# Patient Record
Sex: FEMALE | Race: WHITE | Employment: UNEMPLOYED | URBAN - METROPOLITAN AREA
[De-identification: names, ages, dates, MRNs, and addresses within clinical notes are randomized per-mention and may not be internally consistent; named-entity substitution may affect disease eponyms.]

---

## 2017-09-25 ENCOUNTER — OFFICE VISIT (OUTPATIENT)
Dept: FAMILY MEDICINE CLINIC | Age: 42
End: 2017-09-25

## 2017-09-25 VITALS
RESPIRATION RATE: 18 BRPM | BODY MASS INDEX: 28.77 KG/M2 | WEIGHT: 201 LBS | HEIGHT: 70 IN | SYSTOLIC BLOOD PRESSURE: 138 MMHG | DIASTOLIC BLOOD PRESSURE: 96 MMHG | OXYGEN SATURATION: 98 % | HEART RATE: 87 BPM | TEMPERATURE: 98.2 F

## 2017-09-25 DIAGNOSIS — N93.9 ABNORMAL VAGINAL BLEEDING: Primary | ICD-10-CM

## 2017-09-25 DIAGNOSIS — F32.A DEPRESSIVE DISORDER: ICD-10-CM

## 2017-09-25 DIAGNOSIS — R53.83 FATIGUE, UNSPECIFIED TYPE: ICD-10-CM

## 2017-09-25 DIAGNOSIS — D25.9 UTERINE LEIOMYOMA, UNSPECIFIED LOCATION: ICD-10-CM

## 2017-09-25 DIAGNOSIS — R03.0 ELEVATED BLOOD PRESSURE READING: ICD-10-CM

## 2017-09-25 DIAGNOSIS — N89.8 VAGINAL DISCHARGE: ICD-10-CM

## 2017-09-25 LAB
BILIRUB UR QL STRIP: NEGATIVE
GLUCOSE UR-MCNC: NEGATIVE MG/DL
HCG URINE, QL. (POC): NEGATIVE
KETONES P FAST UR STRIP-MCNC: NEGATIVE MG/DL
PH UR STRIP: 6 [PH] (ref 4.6–8)
PROT UR QL STRIP: NEGATIVE MG/DL
SP GR UR STRIP: 1.02 (ref 1–1.03)
UA UROBILINOGEN AMB POC: NORMAL (ref 0.2–1)
URINALYSIS CLARITY POC: CLEAR
URINALYSIS COLOR POC: YELLOW
URINE BLOOD POC: NORMAL
URINE LEUKOCYTES POC: NEGATIVE
URINE NITRITES POC: NEGATIVE
VALID INTERNAL CONTROL?: YES

## 2017-09-25 RX ORDER — CLINDAMYCIN HYDROCHLORIDE 300 MG/1
300 CAPSULE ORAL 2 TIMES DAILY
Qty: 14 CAP | Refills: 0 | Status: SHIPPED | OUTPATIENT
Start: 2017-09-25 | End: 2017-10-02

## 2017-09-25 NOTE — MR AVS SNAPSHOT
Visit Information Date & Time Provider Department Dept. Phone Encounter #  
 9/25/2017  3:30 PM Sergio Gonzalez NP [address] [facility] [phone] [id number] Follow-up Instructions Return in about 2 weeks (around 10/9/2017) for recheck BP and check cholesterol fasting. Upcoming Health Maintenance Date Due Pneumococcal 19-64 Medium Risk (1 of 1 - PPSV23) 11/10/1993 DTaP/Tdap/Td series (1 - Tdap) 11/10/1995 PAP AKA CERVICAL CYTOLOGY 11/10/1995 INFLUENZA AGE 9 TO ADULT 8/1/2017 Allergies as of 9/25/2017  Review Complete On: 9/25/2017 By: Sergio Gonzalez NP Severity Noted Reaction Type Reactions Ciprofloxacin High 02/17/2016    Swelling Current Immunizations  Reviewed on 3/8/2016 No immunizations on file. Not reviewed this visit You Were Diagnosed With   
  
 Codes Comments Abnormal vaginal bleeding    -  Primary ICD-10-CM: N93.9 ICD-9-CM: 623.8 Vaginal discharge     ICD-10-CM: N89.8 ICD-9-CM: 623.5 Fatigue, unspecified type     ICD-10-CM: R53.83 ICD-9-CM: 780.79 Uterine leiomyoma, unspecified location     ICD-10-CM: D25.9 ICD-9-CM: 218.9 Elevated blood pressure reading     ICD-10-CM: R03.0 ICD-9-CM: 796.2 Depressive disorder     ICD-10-CM: F32.9 ICD-9-CM: 466 Vitals BP Pulse Temp Resp Height(growth percentile) Weight(growth percentile) (!) 138/96 87 98.2 °F (36.8 °C) (Oral) 18 5' 10\" (1.778 m) 201 lb (91.2 kg) LMP SpO2 BMI OB Status Smoking Status 09/08/2017 98% 28.84 kg/m2 Having regular periods Current Every Day Smoker Vitals History BMI and BSA Data Body Mass Index Body Surface Area  
 28.84 kg/m 2 2.12 m 2 Preferred Pharmacy Pharmacy Name Phone [address] Your Updated Medication List  
  
   
This list is accurate as of: 9/25/17  4:40 PM.  Always use your most recent med list.  
  
  
  
  
 clindamycin 300 mg capsule Commonly known as:  CLEOCIN Take 1 Cap by mouth two (2) times a day for 7 days. Prescriptions Sent to Pharmacy Refills  
 clindamycin (CLEOCIN) 300 mg capsule 0 Sig: Take 1 Cap by mouth two (2) times a day for 7 days. Class: Normal  
 Pharmacy: [facility] [id number] [place], [place] [address]wy, [address] #: [phone] Route: Oral  
  
We Performed the Following AMB POC URINALYSIS DIP STICK AUTO W/O MICRO [68464 CPT(R)] AMB POC URINE PREGNANCY TEST, VISUAL COLOR COMPARISON [49687 CPT(R)] BETA HCG, QT [id number] CPT(R)] CBC WITH AUTOMATED DIFF [74757 CPT(R)] IRON PROFILE [id number] CPT(R)] METABOLIC PANEL, COMPREHENSIVE [51761 CPT(R)] [address] [id number] Custom] REFERRAL TO OBSTETRICS AND GYNECOLOGY [REF51 Custom] TSH 3RD GENERATION [92115 CPT(R)] VITAMIN D, 25 HYDROXY [id number] CPT(R)] Follow-up Instructions Return in about 2 weeks (around 10/9/2017) for recheck BP and check cholesterol fasting. To-Do List   
 09/27/2017 Imaging:  CT ABD PELV W WO CONT Referral Information Referral ID Referred By Referred To  
  
 [id number] Lianna Osborne MD   
   [address] [facility] [address] [place], [address] [place] [address] Pkwy Phone: [phone] Fax: [phone] Visits Status Start Date End Date 1 New Request 9/25/17 9/25/18 If your referral has a status of pending review or denied, additional information will be sent to support the outcome of this decision. Patient Instructions Clindamycin (By mouth) Clindamycin (skuh-qg-GJC-sin) Treats infections. Brand Name(s): Cleocin, Cleocin HCl, Cleocin Pediatric There may be other brand names for this medicine. When This Medicine Should Not Be Used: This medicine is not right for everyone.  Do not use it if you had an allergic reaction to clindamycin or lincomycin. How to Use This Medicine:  
Capsule, Liquid · Your doctor will tell you how much medicine to use. Do not use more than directed. · Capsule: Swallow with a full glass of water. · Oral liquid: Measure the oral liquid medicine with a marked measuring spoon, oral syringe, or medicine cup. · Take all of the medicine in your prescription to clear up your infection, even if you feel better after the first few doses. · Missed dose: Take a dose as soon as you remember. If it is almost time for your next dose, wait until then and take a regular dose. Do not take extra medicine to make up for a missed dose. · Store the medicine in a closed container at room temperature, away from heat, moisture, and direct light. Oral liquid: Do not refrigerate or freeze. Throw away any unused medicine after 14 days. Drugs and Foods to Avoid: Ask your doctor or pharmacist before using any other medicine, including over-the-counter medicines, vitamins, and herbal products. · Some medicines can affect how clindamycin works. Tell your doctor if you are using erythromycin. Warnings While Using This Medicine: · Tell your doctor if you are pregnant or breastfeeding, or if you have kidney disease, liver disease, allergies (including an allergy to aspirin), asthma, or stomach or bowel problems (including colitis). · This medicine may cause severe skin reactions. · This medicine can cause diarrhea. Call your doctor if the diarrhea becomes severe, does not stop, or is bloody. Do not take any medicine to stop diarrhea until you have talked to your doctor. Diarrhea can occur 2 months or more after you stop taking this medicine. · Keep all medicine out of the reach of children. Never share your medicine with anyone. Possible Side Effects While Using This Medicine:  
Call your doctor right away if you notice any of these side effects: · Allergic reaction: Itching or hives, swelling in your face or hands, swelling or tingling in your mouth or throat, chest tightness, trouble breathing · Blistering, peeling, red skin rash · Fever, chills, cough, sore throat, body aches · Severe diarrhea that does not go away, stomach cramps · Unusual bleeding, bruising, or weakness If you notice these less serious side effects, talk with your doctor: · Mild diarrhea, nausea If you notice other side effects that you think are caused by this medicine, tell your doctor. Call your doctor for medical advice about side effects. You may report side effects to FDA at 0-940-FCT-0573 © 2017 [address] Information is for End User's use only and may not be sold, redistributed or otherwise used for commercial purposes. The above information is an  only. It is not intended as medical advice for individual conditions or treatments. Talk to your doctor, nurse or pharmacist before following any medical regimen to see if it is safe and effective for you. Introducing [facility]! Bahman Irene introduces [employer] patient portal. Now you can access parts of your medical record, email your doctor's office, and request medication refills online. 1. In your internet browser, go to [email or web address] 2. Click on the First Time User? Click Here link in the Sign In box. You will see the New Member Sign Up page. 3. Enter your [employer] Access Code exactly as it appears below. You will not need to use this code after youve completed the sign-up process. If you do not sign up before the expiration date, you must request a new code. · [employer] Access Code: [email or web address] Expires: 12/24/2017  3:33 PM 
 
4. Enter the last four digits of your Social Security Number (xxxx) and Date of Birth (mm/dd/yyyy) as indicated and click Submit. You will be taken to the next sign-up page. 5. Create a [employer] ID. This will be your [employer] login ID and cannot be changed, so think of one that is secure and easy to remember. 6. Create a [employer] password. You can change your password at any time. 7. Enter your Password Reset Question and Answer. This can be used at a later time if you forget your password. 8. Enter your e-mail address. You will receive e-mail notification when new information is available in [address] 9. Click Sign Up. You can now view and download portions of your medical record. 10. Click the Download Summary menu link to download a portable copy of your medical information. If you have questions, please visit the Frequently Asked Questions section of the [employer] website. Remember, [employer] is NOT to be used for urgent needs. For medical emergencies, dial 911. Now available from your iPhone and Android! Please provide this summary of care documentation to your next provider. Your primary care clinician is listed as ABAD CHAN. If you have any questions after today's visit, please call [phone].

## 2017-09-25 NOTE — PROGRESS NOTES
Chief Complaint   Patient presents with    Vaginal Bleeding    Back Pain     Patient in office today for abnormal bleeding that started on Friday; pt has hx of fibroids and ectopic pregnancies. Have been treating pain with Aleve. About 1 month ago had some malodorous vaginal discharge that resolved on its own. Had a period about 2 weeks ago. Then on Friday pt began spotting. Denies any cramping and typically has severe cramping with her periods. This time more of a bright red blood and no clots. Has been persistent since Friday. Typically has regular periods. Rarely more than a week early and this will be infrequent. Last saw OBGYN over a year ago for her uterine cramping. Was diagnosed with fibroids. Pt has previously been pregnant 7 times. Has had 6 failed pregnancies due to ectopic pregnancy and miscarriages. Only has one fallopian tube. Denies any nausea or vomiting. Mild nausea this morning. Associated back pain that started about a month ago but attributing this to her work. 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 Has a history of back pain with bladder infections. Patient scored 15 on depression screening. Last had lab work over a year ago. Pt has family history of HTN. Mother had 4 bypass surgery. Has not had cholesterol checked in some time. History of kidney stones in the past.     Chief Complaint   Patient presents with    Vaginal Bleeding    Back Pain     she is a 42y.o. year old female who presents for evalution. Reviewed PmHx, RxHx, FmHx, SocHx, AllgHx and updated and dated in the chart.     Review of Systems - negative except as listed above in the HPI    Objective:     Vitals:    09/25/17 1537 09/25/17 1634   BP: (!) 166/113 (!) 138/96   Pulse: 87    Resp: 18    Temp: 98.2 °F (36.8 °C)    TempSrc: Oral    SpO2: 98%    Weight: 201 lb (91.2 kg)    Height: 5' 10\" (1.778 m)      Physical Examination: General appearance - alert, well appearing, and in no distress  Mental status - depressed mood but redirectable  Eyes - pupils equal and reactive, extraocular eye movements intact  Ears - bilateral TM's and external ear canals normal  Nose - normal and patent, no erythema, discharge or polyps and normal nontender sinuses  Mouth - mucous membranes moist, pharynx normal without lesions  Neck - supple, no significant adenopathy, carotids upstroke normal bilaterally, no bruits, thyroid exam: thyroid is normal in size without nodules or tenderness  Chest - clear to auscultation, no wheezes, rales or rhonchi, symmetric air entry  Heart - normal rate, regular rhythm, normal S1, S2, no murmurs  Abdomen - tenderness noted lower abdominal and suprapubic regions with no palpable organomegaly  bowel sounds normal  Extremities - peripheral pulses normal, no ankle edema, no clubbing or cyanosis  Skin - normal coloration and turgor, no rashes, no suspicious skin lesions noted    Assessment/ Plan:   Diagnoses and all orders for this visit:    1. Abnormal vaginal bleeding  -     AMB POC URINALYSIS DIP STICK AUTO W/O MICRO  -     AMB POC URINE PREGNANCY TEST, VISUAL COLOR COMPARISON  -     NUSWAB VAGINITIS PLUS  -     METABOLIC PANEL, COMPREHENSIVE  -     CBC WITH AUTOMATED DIFF  -     TSH 3RD GENERATION  -     IRON PROFILE  -     BETA HCG, QT  -     CT ABD PELV W WO CONT; Future  -     REFERRAL TO OBSTETRICS AND GYNECOLOGY  Will notify results and deviate plan based on findings. Enc pt to consider CT for further evaluation of sx. Reviewed acute / worsening s/sx that warrant more immediate medical attention and pt verbalized understanding of this. 2. Vaginal discharge  -     clindamycin (CLEOCIN) 300 mg capsule; Take 1 Cap by mouth two (2) times a day for 7 days. Complete as directed. Reviewed SEs/aDRs of medication. Will notify results of nuswab and deviate plan based on findings.    3. Fatigue, unspecified type  -     IRON PROFILE  -     VITAMIN D, 25 HYDROXY  Will notify results and deviate plan based on findings. 4. Uterine leiomyoma, unspecified location  -     REFERRAL TO OBSTETRICS AND GYNECOLOGY  Enc pt to follow up with her OBGYN for further evaluation of sx.   5. Elevated blood pressure reading  Recheck BP improved. Enc pt to follow up in 2 weeks to recheck. If BP remains elevated, will need to start a new daily medication for HTN. 6. Depressive disorder  Pt reports that this is situational and is not interested in starting medication at this time. Enc to follow up if sx persist or worsen. Follow-up Disposition:  Return in about 2 weeks (around 10/9/2017) for recheck BP and check cholesterol fasting. I have discussed the diagnosis with the patient and the intended plan as seen in the above orders. The patient has received an after-visit summary and questions were answered concerning future plans. Medication Side Effects and Warnings were discussed with patient: yes  Patient Labs were reviewed and or requested: yes  Patient Past Records were reviewed and or requested  yes  Patient / Caregiver Understanding of treatment plan was verbalized during office visit YES    KISHOR Gonzalez    Patient Instructions   Clindamycin (By mouth)   Clindamycin (totf-sk-SEQ-sin)  Treats infections. Brand Name(s): Cleocin, Cleocin HCl, Cleocin Pediatric   There may be other brand names for this medicine. When This Medicine Should Not Be Used: This medicine is not right for everyone. Do not use it if you had an allergic reaction to clindamycin or lincomycin. How to Use This Medicine:   Capsule, Liquid  · Your doctor will tell you how much medicine to use. Do not use more than directed. · Capsule: Swallow with a full glass of water. · Oral liquid: Measure the oral liquid medicine with a marked measuring spoon, oral syringe, or medicine cup.   · Take all of the medicine in your prescription to clear up your infection, even if you feel better after the first few doses. · Missed dose: Take a dose as soon as you remember. If it is almost time for your next dose, wait until then and take a regular dose. Do not take extra medicine to make up for a missed dose. · Store the medicine in a closed container at room temperature, away from heat, moisture, and direct light. Oral liquid: Do not refrigerate or freeze. Throw away any unused medicine after 14 days. Drugs and Foods to Avoid:   Ask your doctor or pharmacist before using any other medicine, including over-the-counter medicines, vitamins, and herbal products. · Some medicines can affect how clindamycin works. Tell your doctor if you are using erythromycin. Warnings While Using This Medicine:   · Tell your doctor if you are pregnant or breastfeeding, or if you have kidney disease, liver disease, allergies (including an allergy to aspirin), asthma, or stomach or bowel problems (including colitis). · This medicine may cause severe skin reactions. · This medicine can cause diarrhea. Call your doctor if the diarrhea becomes severe, does not stop, or is bloody. Do not take any medicine to stop diarrhea until you have talked to your doctor. Diarrhea can occur 2 months or more after you stop taking this medicine. · Keep all medicine out of the reach of children. Never share your medicine with anyone.   Possible Side Effects While Using This Medicine:   Call your doctor right away if you notice any of these side effects:  · Allergic reaction: Itching or hives, swelling in your face or hands, swelling or tingling in your mouth or throat, chest tightness, trouble breathing  · Blistering, peeling, red skin rash  · Fever, chills, cough, sore throat, body aches  · Severe diarrhea that does not go away, stomach cramps  · Unusual bleeding, bruising, or weakness  If you notice these less serious side effects, talk with your doctor:   · Mild diarrhea, nausea  If you notice other side effects that you think are caused by this medicine, tell your doctor. Call your doctor for medical advice about side effects. You may report side effects to FDA at 8-450-AUL-8033  © 2017 [facility] Information is for End User's use only and may not be sold, redistributed or otherwise used for commercial purposes. The above information is an  only. It is not intended as medical advice for individual conditions or treatments. Talk to your doctor, nurse or pharmacist before following any medical regimen to see if it is safe and effective for you.

## 2017-09-25 NOTE — PROGRESS NOTES
Chief Complaint   Patient presents with    Vaginal Bleeding    Back Pain     Patient in office today for abnormal bleeding that started on Friday; pt has hx of fibroids and ectopic pregnancies. Have been treating pain with Aleve. Patient scored 15 on depression screening. 1. Have you been to the ER, urgent care clinic since your last visit? Hospitalized since your last visit? No    2. Have you seen or consulted any other health care providers outside of the [address] [place] [address] since your last visit? Include any pap smears or colon screening.  No

## 2017-09-25 NOTE — PATIENT INSTRUCTIONS
Clindamycin (By mouth)   Clindamycin (kofu-sn-OPZ-sin)  Treats infections. Brand Name(s): Cleocin, Cleocin HCl, Cleocin Pediatric   There may be other brand names for this medicine. When This Medicine Should Not Be Used: This medicine is not right for everyone. Do not use it if you had an allergic reaction to clindamycin or lincomycin. How to Use This Medicine:   Capsule, Liquid  · Your doctor will tell you how much medicine to use. Do not use more than directed. · Capsule: Swallow with a full glass of water. · Oral liquid: Measure the oral liquid medicine with a marked measuring spoon, oral syringe, or medicine cup. · Take all of the medicine in your prescription to clear up your infection, even if you feel better after the first few doses. · Missed dose: Take a dose as soon as you remember. If it is almost time for your next dose, wait until then and take a regular dose. Do not take extra medicine to make up for a missed dose. · Store the medicine in a closed container at room temperature, away from heat, moisture, and direct light. Oral liquid: Do not refrigerate or freeze. Throw away any unused medicine after 14 days. Drugs and Foods to Avoid:   Ask your doctor or pharmacist before using any other medicine, including over-the-counter medicines, vitamins, and herbal products. · Some medicines can affect how clindamycin works. Tell your doctor if you are using erythromycin. Warnings While Using This Medicine:   · Tell your doctor if you are pregnant or breastfeeding, or if you have kidney disease, liver disease, allergies (including an allergy to aspirin), asthma, or stomach or bowel problems (including colitis). · This medicine may cause severe skin reactions. · This medicine can cause diarrhea. Call your doctor if the diarrhea becomes severe, does not stop, or is bloody. Do not take any medicine to stop diarrhea until you have talked to your doctor.  Diarrhea can occur 2 months or more after you stop taking this medicine. · Keep all medicine out of the reach of children. Never share your medicine with anyone. Possible Side Effects While Using This Medicine:   Call your doctor right away if you notice any of these side effects:  · Allergic reaction: Itching or hives, swelling in your face or hands, swelling or tingling in your mouth or throat, chest tightness, trouble breathing  · Blistering, peeling, red skin rash  · Fever, chills, cough, sore throat, body aches  · Severe diarrhea that does not go away, stomach cramps  · Unusual bleeding, bruising, or weakness  If you notice these less serious side effects, talk with your doctor:   · Mild diarrhea, nausea  If you notice other side effects that you think are caused by this medicine, tell your doctor. Call your doctor for medical advice about side effects. You may report side effects to FDA at 9-321-FDA-7660  © 2017 [address] Information is for End User's use only and may not be sold, redistributed or otherwise used for commercial purposes. The above information is an  only. It is not intended as medical advice for individual conditions or treatments. Talk to your doctor, nurse or pharmacist before following any medical regimen to see if it is safe and effective for you.

## 2017-09-28 DIAGNOSIS — B96.89 BV (BACTERIAL VAGINOSIS): Primary | ICD-10-CM

## 2017-09-28 DIAGNOSIS — N76.0 BV (BACTERIAL VAGINOSIS): Primary | ICD-10-CM

## 2017-09-28 DIAGNOSIS — E55.9 VITAMIN D DEFICIENCY: ICD-10-CM

## 2017-09-28 DIAGNOSIS — D50.9 IRON DEFICIENCY ANEMIA, UNSPECIFIED IRON DEFICIENCY ANEMIA TYPE: ICD-10-CM

## 2017-09-28 LAB
25(OH)D3+25(OH)D2 SERPL-MCNC: 20.2 NG/ML (ref 30–100)
A VAGINAE DNA VAG QL NAA+PROBE: ABNORMAL SCORE
ALBUMIN SERPL-MCNC: 4.2 G/DL (ref 3.5–5.5)
ALBUMIN/GLOB SERPL: 1.4 {RATIO} (ref 1.2–2.2)
ALP SERPL-CCNC: 76 IU/L (ref 39–117)
ALT SERPL-CCNC: 17 IU/L (ref 0–32)
AST SERPL-CCNC: 17 IU/L (ref 0–40)
BASOPHILS # BLD AUTO: 0.1 X10E3/UL (ref 0–0.2)
BASOPHILS NFR BLD AUTO: 1 %
BILIRUB SERPL-MCNC: <0.2 MG/DL (ref 0–1.2)
BUN SERPL-MCNC: 12 MG/DL (ref 6–24)
BUN/CREAT SERPL: 18 (ref 9–23)
BVAB2 DNA VAG QL NAA+PROBE: ABNORMAL SCORE
C ALBICANS DNA VAG QL NAA+PROBE: NEGATIVE
C GLABRATA DNA VAG QL NAA+PROBE: NEGATIVE
C TRACH RRNA SPEC QL NAA+PROBE: NEGATIVE
CALCIUM SERPL-MCNC: 9.5 MG/DL (ref 8.7–10.2)
CHLORIDE SERPL-SCNC: 99 MMOL/L (ref 96–106)
CO2 SERPL-SCNC: 26 MMOL/L (ref 18–29)
CREAT SERPL-MCNC: 0.66 MG/DL (ref 0.57–1)
EOSINOPHIL # BLD AUTO: 0.4 X10E3/UL (ref 0–0.4)
EOSINOPHIL NFR BLD AUTO: 5 %
ERYTHROCYTE [DISTWIDTH] IN BLOOD BY AUTOMATED COUNT: 16 % (ref 12.3–15.4)
GLOBULIN SER CALC-MCNC: 3.1 G/DL (ref 1.5–4.5)
GLUCOSE SERPL-MCNC: 108 MG/DL (ref 65–99)
HCG INTACT+B SERPL-ACNC: <1 MIU/ML
HCT VFR BLD AUTO: 36.5 % (ref 34–46.6)
HGB BLD-MCNC: 11.9 G/DL (ref 11.1–15.9)
IMM GRANULOCYTES # BLD: 0 X10E3/UL (ref 0–0.1)
IMM GRANULOCYTES NFR BLD: 0 %
IRON SATN MFR SERPL: 8 % (ref 15–55)
IRON SERPL-MCNC: 38 UG/DL (ref 27–159)
LYMPHOCYTES # BLD AUTO: 3.2 X10E3/UL (ref 0.7–3.1)
LYMPHOCYTES NFR BLD AUTO: 39 %
MCH RBC QN AUTO: 27.9 PG (ref 26.6–33)
MCHC RBC AUTO-ENTMCNC: 32.6 G/DL (ref 31.5–35.7)
MCV RBC AUTO: 86 FL (ref 79–97)
MEGA1 DNA VAG QL NAA+PROBE: ABNORMAL SCORE
MONOCYTES # BLD AUTO: 0.6 X10E3/UL (ref 0.1–0.9)
MONOCYTES NFR BLD AUTO: 7 %
N GONORRHOEA RRNA SPEC QL NAA+PROBE: NEGATIVE
NEUTROPHILS # BLD AUTO: 3.9 X10E3/UL (ref 1.4–7)
NEUTROPHILS NFR BLD AUTO: 48 %
PLATELET # BLD AUTO: 463 X10E3/UL (ref 150–379)
POTASSIUM SERPL-SCNC: 4.8 MMOL/L (ref 3.5–5.2)
PROT SERPL-MCNC: 7.3 G/DL (ref 6–8.5)
RBC # BLD AUTO: 4.26 X10E6/UL (ref 3.77–5.28)
SODIUM SERPL-SCNC: 139 MMOL/L (ref 134–144)
T VAGINALIS RRNA SPEC QL NAA+PROBE: NEGATIVE
TIBC SERPL-MCNC: 470 UG/DL (ref 250–450)
TSH SERPL DL<=0.005 MIU/L-ACNC: 1.86 UIU/ML (ref 0.45–4.5)
UIBC SERPL-MCNC: 432 UG/DL (ref 131–425)
WBC # BLD AUTO: 8.2 X10E3/UL (ref 3.4–10.8)

## 2017-09-28 RX ORDER — ERGOCALCIFEROL 1.25 MG/1
50000 CAPSULE ORAL
Qty: 12 CAP | Refills: 0 | Status: SHIPPED | OUTPATIENT
Start: 2017-09-28 | End: 2018-05-29

## 2017-09-28 RX ORDER — FERROUS SULFATE 325(65) MG
325 TABLET, DELAYED RELEASE (ENTERIC COATED) ORAL 2 TIMES DAILY
Qty: 180 TAB | Refills: 3 | Status: SHIPPED | OUTPATIENT
Start: 2017-09-28 | End: 2022-10-25

## 2017-09-28 NOTE — PROGRESS NOTES
Please notify pt the followin. Vaginal swab positive for BV. Clindamycin prescribed during OV will also treat BV. Complete as directed. 2. Negative for pregnancy. 3. Iron levels are very low. I recommend she start a BID iron supplement. rx sent to pharmacy on file. Follow up in 4-6 weeks to recheck iron levels. 4. Vitamin D levels are low. Will send in once weekly vitamin D for pt to take over the next 3 months. All other labs are normal. Will send letter with results and recs. Follow up in 4-6 weeks to repeat labs. Continue with plan to follow up with OBGYN if vaginal bleeding sx persist or worsen.

## 2017-10-10 ENCOUNTER — OFFICE VISIT (OUTPATIENT)
Dept: FAMILY MEDICINE CLINIC | Age: 42
End: 2017-10-10

## 2017-10-10 VITALS
WEIGHT: 204 LBS | OXYGEN SATURATION: 99 % | HEART RATE: 82 BPM | BODY MASS INDEX: 29.2 KG/M2 | SYSTOLIC BLOOD PRESSURE: 183 MMHG | TEMPERATURE: 98 F | HEIGHT: 70 IN | DIASTOLIC BLOOD PRESSURE: 118 MMHG | RESPIRATION RATE: 18 BRPM

## 2017-10-10 DIAGNOSIS — I10 ESSENTIAL HYPERTENSION: Primary | ICD-10-CM

## 2017-10-10 DIAGNOSIS — B35.1 ONYCHOMYCOSIS: ICD-10-CM

## 2017-10-10 RX ORDER — LISINOPRIL AND HYDROCHLOROTHIAZIDE 20; 25 MG/1; MG/1
1 TABLET ORAL DAILY
Qty: 30 TAB | Refills: 2 | Status: SHIPPED | OUTPATIENT
Start: 2017-10-10 | End: 2017-11-14 | Stop reason: SINTOL

## 2017-10-10 RX ORDER — TERBINAFINE HYDROCHLORIDE 250 MG/1
250 TABLET ORAL DAILY
Qty: 30 TAB | Refills: 2 | Status: SHIPPED | OUTPATIENT
Start: 2017-10-10 | End: 2018-02-07 | Stop reason: ALTCHOICE

## 2017-10-10 NOTE — PROGRESS NOTES
Chief Complaint   Patient presents with    Blood Pressure Check    Labs     Patient in office today for 2 wk bp check and fasting labs;have no c/o.    1. Have you been to the ER, urgent care clinic since your last visit? Hospitalized since your last visit? No    2. Have you seen or consulted any other health care providers outside of the [address] [place] [address] since your last visit? Include any pap smears or colon screening.  No

## 2017-10-10 NOTE — MR AVS SNAPSHOT
Visit Information Date & Time Provider Department Dept. Phone Encounter #  
 10/10/2017  6:00 PM Tawana Nava NP Jyoti Kiser [facility] [phone] [id number] Follow-up Instructions Return in about 1 week (around 10/17/2017) for Please call with an updated BP reading. Upcoming Health Maintenance Date Due Pneumococcal 19-64 Medium Risk (1 of 1 - PPSV23) 11/10/1993 DTaP/Tdap/Td series (1 - Tdap) 11/10/1995 PAP AKA CERVICAL CYTOLOGY 11/10/1995 INFLUENZA AGE 9 TO ADULT 8/1/2017 Allergies as of 10/10/2017  Review Complete On: 10/10/2017 By: Tawana Nava NP Severity Noted Reaction Type Reactions Ciprofloxacin High 02/17/2016    Swelling Current Immunizations  Reviewed on 3/8/2016 No immunizations on file. Not reviewed this visit You Were Diagnosed With   
  
 Codes Comments Essential hypertension    -  Primary ICD-10-CM: I10 
ICD-9-CM: 401.9 Onychomycosis     ICD-10-CM: B35.1 ICD-9-CM: 110.1 Vitals BP Pulse Temp Resp Height(growth percentile) Weight(growth percentile) (!) 183/118 (BP 1 Location: Right arm, BP Patient Position: Sitting) 82 98 °F (36.7 °C) (Oral) 18 5' 10\" (1.778 m) 204 lb (92.5 kg) LMP SpO2 BMI OB Status Smoking Status 09/08/2017 99% 29.27 kg/m2 Having regular periods Current Every Day Smoker Vitals History BMI and BSA Data Body Mass Index Body Surface Area  
 29.27 kg/m 2 2.14 m 2 Preferred Pharmacy Pharmacy Name Phone [address] Your Updated Medication List  
  
   
This list is accurate as of: 10/10/17  6:09 PM.  Always use your most recent med list.  
  
  
  
  
 ergocalciferol 50,000 unit capsule Commonly known as:  ERGOCALCIFEROL Take 1 Cap by mouth every seven (7) days. ferrous sulfate 325 mg (65 mg iron) EC tablet Commonly known as:  IRON  
 Take 1 Tab by mouth two (2) times a day. lisinopril-hydroCHLOROthiazide 20-25 mg per tablet Commonly known as:  Sridhar Roper Take 1 Tab by mouth daily. terbinafine HCl 250 mg tablet Commonly known as:  LAMISIL Take 1 Tab by mouth daily. Prescriptions Sent to Pharmacy Refills  
 lisinopril-hydroCHLOROthiazide (PRINZIDE, ZESTORETIC) 20-25 mg per tablet 2 Sig: Take 1 Tab by mouth daily. Class: Normal  
 Pharmacy: [address] Ph #: [phone] Route: Oral  
 terbinafine HCl (LAMISIL) 250 mg tablet 2 Sig: Take 1 Tab by mouth daily. Class: Normal  
 Pharmacy: [address] Ph #: [phone] Route: Oral  
  
We Performed the Following LIPID PANEL [06126 CPT(R)] Follow-up Instructions Return in about 1 week (around 10/17/2017) for Please call with an updated BP reading. Patient Instructions High Blood Pressure: Care Instructions Your Care Instructions If your blood pressure is usually above 140/90, you have high blood pressure, or hypertension. That means the top number is 140 or higher or the bottom number is 90 or higher, or both. Despite what a lot of people think, high blood pressure usually doesn't cause headaches or make you feel dizzy or lightheaded. It usually has no symptoms. But it does increase your risk for heart attack, stroke, and kidney or eye damage. The higher your blood pressure, the more your risk increases. Your doctor will give you a goal for your blood pressure. Your goal will be based on your health and your age. An example of a goal is to keep your blood pressure below 140/90. Lifestyle changes, such as eating healthy and being active, are always important to help lower blood pressure.  You might also take medicine to reach your blood pressure goal. 
 Follow-up care is a key part of your treatment and safety. Be sure to make and go to all appointments, and call your doctor if you are having problems. It's also a good idea to know your test results and keep a list of the medicines you take. How can you care for yourself at home? Medical treatment · If you stop taking your medicine, your blood pressure will go back up. You may take one or more types of medicine to lower your blood pressure. Be safe with medicines. Take your medicine exactly as prescribed. Call your doctor if you think you are having a problem with your medicine. · Talk to your doctor before you start taking aspirin every day. Aspirin can help certain people lower their risk of a heart attack or stroke. But taking aspirin isn't right for everyone, because it can cause serious bleeding. · See your doctor regularly. You may need to see the doctor more often at first or until your blood pressure comes down. · If you are taking blood pressure medicine, talk to your doctor before you take decongestants or anti-inflammatory medicine, such as ibuprofen. Some of these medicines can raise blood pressure. · Learn how to check your blood pressure at home. Lifestyle changes · Stay at a healthy weight. This is especially important if you put on weight around the waist. Losing even 10 pounds can help you lower your blood pressure. · If your doctor recommends it, get more exercise. Walking is a good choice. Bit by bit, increase the amount you walk every day. Try for at least 30 minutes on most days of the week. You also may want to swim, bike, or do other activities. · Avoid or limit alcohol. Talk to your doctor about whether you can drink any alcohol. · Try to limit how much sodium you eat to less than 2,300 milligrams (mg) a day. Your doctor may ask you to try to eat less than 1,500 mg a day.  
· Eat plenty of fruits (such as bananas and oranges), vegetables, legumes, whole grains, and low-fat dairy products. · Lower the amount of saturated fat in your diet. Saturated fat is found in animal products such as milk, cheese, and meat. Limiting these foods may help you lose weight and also lower your risk for heart disease. · Do not smoke. Smoking increases your risk for heart attack and stroke. If you need help quitting, talk to your doctor about stop-smoking programs and medicines. These can increase your chances of quitting for good. When should you call for help? Call 911 anytime you think you may need emergency care. This may mean having symptoms that suggest that your blood pressure is causing a serious heart or blood vessel problem. Your blood pressure may be over 180/110. For example, call 911 if: 
· You have symptoms of a heart attack. These may include: ¨ Chest pain or pressure, or a strange feeling in the chest. 
¨ Sweating. ¨ Shortness of breath. ¨ Nausea or vomiting. ¨ Pain, pressure, or a strange feeling in the back, neck, jaw, or upper belly or in one or both shoulders or arms. ¨ Lightheadedness or sudden weakness. ¨ A fast or irregular heartbeat. · You have symptoms of a stroke. These may include: 
¨ Sudden numbness, tingling, weakness, or loss of movement in your face, arm, or leg, especially on only one side of your body. ¨ Sudden vision changes. ¨ Sudden trouble speaking. ¨ Sudden confusion or trouble understanding simple statements. ¨ Sudden problems with walking or balance. ¨ A sudden, severe headache that is different from past headaches. · You have severe back or belly pain. Do not wait until your blood pressure comes down on its own. Get help right away. Call your doctor now or seek immediate care if: 
· Your blood pressure is much higher than normal (such as 180/110 or higher), but you don't have symptoms. · You think high blood pressure is causing symptoms, such as: ¨ Severe headache. ¨ Blurry vision. Watch closely for changes in your health, and be sure to contact your doctor if: 
· Your blood pressure measures 140/90 or higher at least 2 times. That means the top number is 140 or higher or the bottom number is 90 or higher, or both. · You think you may be having side effects from your blood pressure medicine. · Your blood pressure is usually normal, but it goes above normal at least 2 times. Where can you learn more? Go to http://barbie-claire.info/. Enter E416 in the search box to learn more about \"High Blood Pressure: Care Instructions. \" Current as of: August 8, 2016 Content Version: 11.3 © 7937-9256 [employer] Care instructions adapted under license by [employer] (which disclaims liability or warranty for this information). If you have questions about a medical condition or this instruction, always ask your healthcare professional. [address] any warranty or liability for your use of this information. Introducing [facility]! Rani Cintron introduces Harir patient portal. Now you can access parts of your medical record, email your doctor's office, and request medication refills online. 1. In your internet browser, go to [email or web address] 2. Click on the First Time User? Click Here link in the Sign In box. You will see the New Member Sign Up page. 3. Enter your Harir Access Code exactly as it appears below. You will not need to use this code after youve completed the sign-up process. If you do not sign up before the expiration date, you must request a new code. · Harir Access Code: [email or web address] Expires: 12/24/2017  3:33 PM 
 
4. Enter the last four digits of your Social Security Number (xxxx) and Date of Birth (mm/dd/yyyy) as indicated and click Submit. You will be taken to the next sign-up page. 5. Create a Harir ID.  This will be your Harir login ID and cannot be changed, so think of one that is secure and easy to remember. 6. Create a [employer] password. You can change your password at any time. 7. Enter your Password Reset Question and Answer. This can be used at a later time if you forget your password. 8. Enter your e-mail address. You will receive e-mail notification when new information is available in [address] 9. Click Sign Up. You can now view and download portions of your medical record. 10. Click the Download Summary menu link to download a portable copy of your medical information. If you have questions, please visit the Frequently Asked Questions section of the [employer] website. Remember, [employer] is NOT to be used for urgent needs. For medical emergencies, dial 911. Now available from your iPhone and Android! Please provide this summary of care documentation to your next provider. Your primary care clinician is listed as ABAD CHAN. If you have any questions after today's visit, please call [phone].

## 2017-10-10 NOTE — PATIENT INSTRUCTIONS

## 2017-10-10 NOTE — PROGRESS NOTES
Chief Complaint   Patient presents with    Blood Pressure Check    Labs     Patient in office today for 2 wk bp check and fasting labs; have no c/o.    1. Have you been to the ER, urgent care clinic since your last visit? Hospitalized since your last visit? No    2. Have you seen or consulted any other health care providers outside of the [address] [place] [address] since your last visit? Include any pap smears or colon screening. No    Chief Complaint   Patient presents with    Blood [address]     she is a 42y.o. year old female who presents for evalution. Reviewed PmHx, RxHx, FmHx, SocHx, AllgHx and updated and dated in the chart. Review of Systems - negative except as listed above in the HPI    Objective:     Vitals:    10/10/17 1737   BP: (!) 183/118   Pulse: 82   Resp: 18   Temp: 98 °F (36.7 °C)   TempSrc: Oral   SpO2: 99%   Weight: 204 lb (92.5 kg)   Height: 5' 10\" (1.778 m)     Physical Examination: General appearance - alert, well appearing, and in no distress  Chest - clear to auscultation, no wheezes, rales or rhonchi, symmetric air entry  Heart - normal rate, regular rhythm, normal S1, S2, no murmurs    Assessment/ Plan:   Diagnoses and all orders for this visit:    1. Essential hypertension  -     LIPID PANEL  -     lisinopril-hydroCHLOROthiazide (PRINZIDE, ZESTORETIC) 20-25 mg per tablet; Take 1 Tab by mouth daily. Will notify results and deviate plan based on findings. Start lisinopril HCTZ daily. Reviewed importance of getting BP down to less than 145/90. Reviewed DASH diet. Enc pt to monitor BP closely at home. Reviewed s/sx of high bp that warrant more immediate medical attention and pt verbalized understanding of this. 2. Onychomycosis  -     terbinafine HCl (LAMISIL) 250 mg tablet; Take 1 Tab by mouth daily. Take as directed. Reviewed SEs/ADRs of medication.       Follow-up Disposition:  Return in about 1 week (around 10/17/2017) for Please call with an updated BP reading. I have discussed the diagnosis with the patient and the intended plan as seen in the above orders. The patient has received an after-visit summary and questions were answered concerning future plans. Medication Side Effects and Warnings were discussed with patient: yes  Patient Labs were reviewed and or requested: yes  Patient Past Records were reviewed and or requested  yes  Patient / Caregiver Understanding of treatment plan was verbalized during office visit KISHOR Barton    Patient Instructions        High Blood Pressure: Care Instructions  Your Care Instructions  If your blood pressure is usually above 140/90, you have high blood pressure, or hypertension. That means the top number is 140 or higher or the bottom number is 90 or higher, or both. Despite what a lot of people think, high blood pressure usually doesn't cause headaches or make you feel dizzy or lightheaded. It usually has no symptoms. But it does increase your risk for heart attack, stroke, and kidney or eye damage. The higher your blood pressure, the more your risk increases. Your doctor will give you a goal for your blood pressure. Your goal will be based on your health and your age. An example of a goal is to keep your blood pressure below 140/90. Lifestyle changes, such as eating healthy and being active, are always important to help lower blood pressure. You might also take medicine to reach your blood pressure goal.  Follow-up care is a key part of your treatment and safety. Be sure to make and go to all appointments, and call your doctor if you are having problems. It's also a good idea to know your test results and keep a list of the medicines you take. How can you care for yourself at home? Medical treatment  · If you stop taking your medicine, your blood pressure will go back up. You may take one or more types of medicine to lower your blood pressure. Be safe with medicines.  Take your medicine exactly as prescribed. Call your doctor if you think you are having a problem with your medicine. · Talk to your doctor before you start taking aspirin every day. Aspirin can help certain people lower their risk of a heart attack or stroke. But taking aspirin isn't right for everyone, because it can cause serious bleeding. · See your doctor regularly. You may need to see the doctor more often at first or until your blood pressure comes down. · If you are taking blood pressure medicine, talk to your doctor before you take decongestants or anti-inflammatory medicine, such as ibuprofen. Some of these medicines can raise blood pressure. · Learn how to check your blood pressure at home. Lifestyle changes  · Stay at a healthy weight. This is especially important if you put on weight around the waist. Losing even 10 pounds can help you lower your blood pressure. · If your doctor recommends it, get more exercise. Walking is a good choice. Bit by bit, increase the amount you walk every day. Try for at least 30 minutes on most days of the week. You also may want to swim, bike, or do other activities. · Avoid or limit alcohol. Talk to your doctor about whether you can drink any alcohol. · Try to limit how much sodium you eat to less than 2,300 milligrams (mg) a day. Your doctor may ask you to try to eat less than 1,500 mg a day. · Eat plenty of fruits (such as bananas and oranges), vegetables, legumes, whole grains, and low-fat dairy products. · Lower the amount of saturated fat in your diet. Saturated fat is found in animal products such as milk, cheese, and meat. Limiting these foods may help you lose weight and also lower your risk for heart disease. · Do not smoke. Smoking increases your risk for heart attack and stroke. If you need help quitting, talk to your doctor about stop-smoking programs and medicines. These can increase your chances of quitting for good. When should you call for help?   Call 911 anytime you think you may need emergency care. This may mean having symptoms that suggest that your blood pressure is causing a serious heart or blood vessel problem. Your blood pressure may be over 180/110. For example, call 911 if:  · You have symptoms of a heart attack. These may include:  ¨ Chest pain or pressure, or a strange feeling in the chest.  ¨ Sweating. ¨ Shortness of breath. ¨ Nausea or vomiting. ¨ Pain, pressure, or a strange feeling in the back, neck, jaw, or upper belly or in one or both shoulders or arms. ¨ Lightheadedness or sudden weakness. ¨ A fast or irregular heartbeat. · You have symptoms of a stroke. These may include:  ¨ Sudden numbness, tingling, weakness, or loss of movement in your face, arm, or leg, especially on only one side of your body. ¨ Sudden vision changes. ¨ Sudden trouble speaking. ¨ Sudden confusion or trouble understanding simple statements. ¨ Sudden problems with walking or balance. ¨ A sudden, severe headache that is different from past headaches. · You have severe back or belly pain. Do not wait until your blood pressure comes down on its own. Get help right away. Call your doctor now or seek immediate care if:  · Your blood pressure is much higher than normal (such as 180/110 or higher), but you don't have symptoms. · You think high blood pressure is causing symptoms, such as:  ¨ Severe headache. ¨ Blurry vision. Watch closely for changes in your health, and be sure to contact your doctor if:  · Your blood pressure measures 140/90 or higher at least 2 times. That means the top number is 140 or higher or the bottom number is 90 or higher, or both. · You think you may be having side effects from your blood pressure medicine. · Your blood pressure is usually normal, but it goes above normal at least 2 times. Where can you learn more? Go to http://barbie-claire.info/.   Enter N834 in the search box to learn more about \"High Blood Pressure: Care Instructions. \"  Current as of: August 8, 2016  Content Version: 11.3  © 9415-6874 [employer], [facility]. Care instructions adapted under license by [employer] (which disclaims liability or warranty for this information). If you have questions about a medical condition or this instruction, always ask your healthcare professional. [place] [address] any warranty or liability for your use of this information.

## 2017-10-11 LAB
CHOLEST SERPL-MCNC: 180 MG/DL (ref 100–199)
HDLC SERPL-MCNC: 27 MG/DL
INTERPRETATION, 910389: NORMAL
LDLC SERPL CALC-MCNC: ABNORMAL MG/DL (ref 0–99)
PDF IMAGE, 910387: NORMAL
TRIGL SERPL-MCNC: 689 MG/DL (ref 0–149)
VLDLC SERPL CALC-MCNC: ABNORMAL MG/DL (ref 5–40)

## 2017-10-12 ENCOUNTER — TELEPHONE (OUTPATIENT)
Dept: FAMILY MEDICINE CLINIC | Age: 42
End: 2017-10-12

## 2017-10-12 DIAGNOSIS — E78.1 HYPERTRIGLYCERIDEMIA: Primary | ICD-10-CM

## 2017-10-12 RX ORDER — FENOFIBRATE 145 MG/1
145 TABLET, COATED ORAL DAILY
Qty: 30 TAB | Refills: 2 | Status: SHIPPED | OUTPATIENT
Start: 2017-10-12 | End: 2018-04-27 | Stop reason: SDUPTHER

## 2017-10-12 NOTE — PROGRESS NOTES
Please notify pt that her TGs are so elevated that the rest of her cholesterol could not be calculated. TGs are over 4 times what they should be which puts her at risk for heart disease. Unfortunately, I am going to recommend she start a new daily medication to lower this. I have called in rx for Tricor to pharmacy on file. If too expensive enc pt to let me know and will send in cheaper alternative. I recommend we repeat fasting labs in 4-6 weeks. The best way to bring that number down is to incorporate more omega 3's into the diet.  Here are some suggestions on how to do that:  - eat 2-3 serving of fish like salmon and trout per week  - eat lots of fresh dark leafy green vegetables  - incorporate more garlic into your diet

## 2017-10-19 ENCOUNTER — TELEPHONE (OUTPATIENT)
Dept: FAMILY MEDICINE CLINIC | Age: 42
End: 2017-10-19

## 2017-10-19 NOTE — TELEPHONE ENCOUNTER
Pt reports her bp and pulse readings since starting her new bp medication on 10/11/17    10/11/17 @ 4:45 pm 150/07  P 85           5:55 pm   163/94  P 88           7:14 pm   147/94  P 85  10/12/17 @ 7:36 am    120/83 P 96           7:38 pm    94/74   P 97            8:27 pm    124/82  P 100           8:30 pm    116/75  P 97  10/13/17 @  7:20 am    129/87  P 95            5:03 pm    153/100  P 97              11:22 pm  136/90  P 96  10/14/17 @  1:51 pm    113/87  P 97                       2:14 pm    109/74  P 95            2:55 pm    114/82  P 116             5:33 pm    135/93  P 101            5:43 pm   129/89  P 91  10/15/17 @  3:45 pm   104/75  P 102            4:00 pm   104/74  P 101             7:32 pm   124/83  P 101             8:42 pm    149-91 P 88             9:28  Pm    127/88 P 99  10/16/17 @   5:22 Pm    135/89   P 97                        6:11 pm     151/94   P 887             9:30 PM     149/100  P 96  10/17/17 @    7:10 am     155/100  P 90              7:15 am   133/92  P 90              5:06 pm    125/84  P 97              7:11 pm  142/92  P 111               7:15 PM   129/49   P 94               4:45 PM   123/86  P 103  10/19/17 @   7:23 am    154/101  P 114    Pt's contact number is [phone].

## 2017-10-19 NOTE — TELEPHONE ENCOUNTER
Looks like things are trending down in the right directed. If pt continues to have a HR greater then 100, may need to add additional medication. Okay to start checking BP only once daily.

## 2017-10-30 ENCOUNTER — TELEPHONE (OUTPATIENT)
Dept: FAMILY MEDICINE CLINIC | Age: 42
End: 2017-10-30

## 2017-10-30 NOTE — TELEPHONE ENCOUNTER
Pt calling and needs to speak with someone about what medications are listed that she is allergic too. Please call her back at [phone].

## 2017-11-14 ENCOUNTER — TELEPHONE (OUTPATIENT)
Dept: FAMILY MEDICINE CLINIC | Age: 42
End: 2017-11-14

## 2017-11-14 RX ORDER — AMLODIPINE BESYLATE 10 MG/1
10 TABLET ORAL DAILY
Qty: 30 TAB | Refills: 2 | Status: SHIPPED | OUTPATIENT
Start: 2017-11-14 | End: 2018-05-18 | Stop reason: SDUPTHER

## 2017-11-14 NOTE — TELEPHONE ENCOUNTER
----- Message from Zuleima Alex sent at 11/14/2017  1:19 PM EST -----  Regarding: Np / Telephone  Patient was in [facility] 11/14/17 and had allergic reaction to her blood pressure medication. They told her to call PCP and has something else to be prescribed.   Please call patient back at [phone]

## 2017-11-14 NOTE — TELEPHONE ENCOUNTER
New rx for Norvasc 10 mg sent to pharmacy on file. Advise pt to start with taking 1/2 tab first few days and then follow up in office in 1-2 weeks to recheck BP.

## 2017-11-14 NOTE — TELEPHONE ENCOUNTER
Spoke with pt; advised of Np recommendations and reviewed dosage instructions;stated she understood.

## 2017-11-20 ENCOUNTER — OFFICE VISIT (OUTPATIENT)
Dept: FAMILY MEDICINE CLINIC | Age: 42
End: 2017-11-20

## 2017-11-20 VITALS
HEIGHT: 70 IN | HEART RATE: 80 BPM | RESPIRATION RATE: 18 BRPM | BODY MASS INDEX: 28.63 KG/M2 | DIASTOLIC BLOOD PRESSURE: 80 MMHG | SYSTOLIC BLOOD PRESSURE: 134 MMHG | OXYGEN SATURATION: 98 % | WEIGHT: 200 LBS | TEMPERATURE: 97.9 F

## 2017-11-20 DIAGNOSIS — T78.40XA ALLERGIC REACTION, INITIAL ENCOUNTER: Primary | ICD-10-CM

## 2017-11-20 RX ORDER — PREDNISONE 10 MG/1
TABLET ORAL
COMMUNITY
End: 2018-02-07 | Stop reason: ALTCHOICE

## 2017-11-20 RX ORDER — HYDROXYZINE PAMOATE 25 MG/1
25 CAPSULE ORAL
Qty: 45 CAP | Refills: 1 | Status: SHIPPED | OUTPATIENT
Start: 2017-11-20 | End: 2017-12-04

## 2017-11-20 NOTE — PROGRESS NOTES
Pt here c/o allergic reaction to lisinopril. Reports having hives and itching all over body. States she was evaluated in ED on 11/14/17. Pt is currently taking Prednisone, but hives has not improved.

## 2017-11-20 NOTE — PROGRESS NOTES
Pt here c/o allergic reaction to lisinopril. Reports having hives and itching all over body. States she was evaluated in ED on 11/14/17. Pt is currently taking Prednisone, but hives has not improved. Pt has stopped lisinopril, she was also around dust and wood shavings. Subjective: (As above and below)     Chief Complaint   Patient presents with    Allergic Reaction     she is a 36y.o. year old female who presents for evaluation. Reviewed PmHx, RxHx, FmHx, SocHx, AllgHx and updated in chart. Review of Systems - negative except as listed above    Objective:     Vitals:    11/20/17 1524   BP: 134/80   Pulse: 80   Resp: 18   Temp: 97.9 °F (36.6 °C)   TempSrc: Oral   SpO2: 98%   Weight: 200 lb (90.7 kg)   Height: 5' 10\" (1.778 m)     Physical Examination: General appearance - alert, well appearing, and in no distress  Mental status - normal mood, behavior, speech, dress, motor activity, and thought processes  Mouth - mucous membranes moist, pharynx normal without lesions  Chest - clear to auscultation, no wheezes, rales or rhonchi, symmetric air entry  Heart - normal rate, regular rhythm, normal S1, S2, no murmurs, rubs, clicks or gallops  Skin - diffuse erythema, +dermatographism, urticaria on arms, trunk and legs    Assessment/ Plan:   1. Allergic reaction, initial encounter  -solumedrol injection  -vistaril as needed  - METHYLPREDNISOLONE INJECTION (Qty 2)  - THER/PROPH/DIAG INJECTION, SUBCUT/IM     Follow-up Disposition: As needed  I have discussed the diagnosis with the patient and the intended plan as seen in the above orders. The patient has received an after-visit summary and questions were answered concerning future plans.      Medication Side Effects and Warnings were discussed with patient: yes  Patient Labs were reviewed: yes  Patient Past Records were reviewed:  yes    Juan Pablo Longoria M.D.

## 2017-11-20 NOTE — MR AVS SNAPSHOT
Visit Information Date & Time Provider Department Dept. Phone Encounter #  
 11/20/2017  3:15 PM Cristina Brewer MD [address] [facility] [phone] [id number] Upcoming Health Maintenance Date Due Pneumococcal 19-64 Medium Risk (1 of 1 - PPSV23) 11/10/1993 DTaP/Tdap/Td series (1 - Tdap) 11/10/1995 PAP AKA CERVICAL CYTOLOGY 11/10/1995 Influenza Age 5 to Adult 8/1/2017 Allergies as of 11/20/2017  Review Complete On: 11/20/2017 By: Cristina Brewer MD  
  
 Severity Noted Reaction Type Reactions Ciprofloxacin High 02/17/2016    Swelling Lisinopril  11/14/2017    Other (comments) Current Immunizations  Reviewed on 3/8/2016 No immunizations on file. Not reviewed this visit You Were Diagnosed With   
  
 Codes Comments Allergic reaction, initial encounter    -  Primary ICD-10-CM: T78.40XA ICD-9-CM: 885. 3 Vitals BP Pulse Temp Resp Height(growth percentile) Weight(growth percentile) 134/80 (BP 1 Location: Left arm, BP Patient Position: Sitting) 80 97.9 °F (36.6 °C) (Oral) 18 5' 10\" (1.778 m) 200 lb (90.7 kg) LMP SpO2 BMI OB Status Smoking Status 11/14/2017 (Exact Date) 98% 28.7 kg/m2 Having regular periods Current Every Day Smoker Vitals History BMI and BSA Data Body Mass Index Body Surface Area 28.7 kg/m 2 2.12 m 2 Preferred Pharmacy Pharmacy Name Phone [address] Your Updated Medication List  
  
   
This list is accurate as of: 11/20/17  3:43 PM.  Always use your most recent med list. amLODIPine 10 mg tablet Commonly known as:  Lennis Eagles Take 1 Tab by mouth daily. ergocalciferol 50,000 unit capsule Commonly known as:  ERGOCALCIFEROL Take 1 Cap by mouth every seven (7) days. fenofibrate nanocrystallized 145 mg tablet Commonly known as:  [employer]  
 Take 1 Tab by mouth daily. ferrous sulfate 325 mg (65 mg iron) EC tablet Commonly known as:  IRON Take 1 Tab by mouth two (2) times a day.  
  
 hydrOXYzine pamoate 25 mg capsule Commonly known as:  VISTARIL Take 1 Cap by mouth three (3) times daily as needed for Itching for up to 14 days. methylPREDNISolone (PF) 125 mg/2 mL Solr Commonly known as:  SOLU-MEDROL 2 mL by IntraVENous route once for 1 dose. predniSONE 10 mg tablet Commonly known as:  Robert Lever Take  by mouth daily (with breakfast). terbinafine HCl 250 mg tablet Commonly known as:  LAMISIL Take 1 Tab by mouth daily. Prescriptions Sent to Pharmacy Refills  
 hydrOXYzine pamoate (VISTARIL) 25 mg capsule 1 Sig: Take 1 Cap by mouth three (3) times daily as needed for Itching for up to 14 days. Class: Normal  
 Pharmacy: [facility] [address] [place] [address] Ph #: [phone] Route: Oral  
  
We Performed the Following METHYLPREDNISOLONE INJECTION [ [facility]] CA THER/PROPH/DIAG INJECTION, SUBCUT/IM [id number] CPT(R)] Introducing hospitals & HEALTH SERVICES! Hai Galvan introduces [employer] patient portal. Now you can access parts of your medical record, email your doctor's office, and request medication refills online. 1. In your internet browser, go to [email or web address] 2. Click on the First Time User? Click Here link in the Sign In box. You will see the New Member Sign Up page. 3. Enter your [employer] Access Code exactly as it appears below. You will not need to use this code after youve completed the sign-up process. If you do not sign up before the expiration date, you must request a new code. · [employer] Access Code: [email or web address] Expires: 12/24/2017  2:33 PM 
 
4. Enter the last four digits of your Social Security Number (xxxx) and Date of Birth (mm/dd/yyyy) as indicated and click Submit.  You will be taken to the next sign-up page. 5. Create a PeopleGoal ID. This will be your PeopleGoal login ID and cannot be changed, so think of one that is secure and easy to remember. 6. Create a PeopleGoal password. You can change your password at any time. 7. Enter your Password Reset Question and Answer. This can be used at a later time if you forget your password. 8. Enter your e-mail address. You will receive e-mail notification when new information is available in [address] 9. Click Sign Up. You can now view and download portions of your medical record. 10. Click the Download Summary menu link to download a portable copy of your medical information. If you have questions, please visit the Frequently Asked Questions section of the PeopleGoal website. Remember, People[employer] is NOT to be used for urgent needs. For medical emergencies, dial 911. Now available from your iPhone and Android! Please provide this summary of care documentation to your next provider. Your primary care clinician is listed as ABAD CHAN. If you have any questions after today's visit, please call [phone].

## 2018-02-07 ENCOUNTER — OFFICE VISIT (OUTPATIENT)
Dept: FAMILY MEDICINE CLINIC | Age: 43
End: 2018-02-07

## 2018-02-07 VITALS
BODY MASS INDEX: 29.15 KG/M2 | SYSTOLIC BLOOD PRESSURE: 135 MMHG | WEIGHT: 203.6 LBS | OXYGEN SATURATION: 97 % | DIASTOLIC BLOOD PRESSURE: 79 MMHG | HEIGHT: 70 IN | TEMPERATURE: 98 F | RESPIRATION RATE: 18 BRPM | HEART RATE: 85 BPM

## 2018-02-07 DIAGNOSIS — B35.1 ONYCHOMYCOSIS: Primary | ICD-10-CM

## 2018-02-07 DIAGNOSIS — N89.8 VAGINAL DISCHARGE: ICD-10-CM

## 2018-02-07 RX ORDER — ITRACONAZOLE 100 MG/1
CAPSULE ORAL
Qty: 28 CAP | Refills: 2 | Status: SHIPPED | OUTPATIENT
Start: 2018-02-07 | End: 2018-05-29

## 2018-02-07 RX ORDER — METRONIDAZOLE 500 MG/1
500 TABLET ORAL 2 TIMES DAILY
Qty: 14 TAB | Refills: 0 | Status: SHIPPED | OUTPATIENT
Start: 2018-02-07 | End: 2018-02-14

## 2018-02-07 NOTE — MR AVS SNAPSHOT
[address] [address] 
[phone] Patient: Radha Ibarra MRN: [id number] Visit Information Date & Time Provider Department Dept. Phone Encounter #  
 2/7/2018  3:15 PM Mary Colbert MD [address] [facility] [phone] [id number] Upcoming Health Maintenance Date Due Pneumococcal 19-64 Medium Risk (1 of 1 - PPSV23) 11/10/1993 DTaP/Tdap/Td series (1 - Tdap) 11/10/1995 PAP AKA CERVICAL CYTOLOGY 11/10/1995 Allergies as of 2/7/2018  Review Complete On: 2/7/2018 By: Mary Colbert MD  
  
 Severity Noted Reaction Type Reactions Ciprofloxacin High 02/17/2016    Swelling Lisinopril  11/14/2017    Other (comments) Current Immunizations  Reviewed on 3/8/2016 No immunizations on file. Not reviewed this visit You Were Diagnosed With   
  
 Codes Comments Onychomycosis    -  Primary ICD-10-CM: B35.1 ICD-9-CM: 110.1 Vaginal discharge     ICD-10-CM: N89.8 ICD-9-CM: 623.5 Vitals BP Pulse Temp Resp Height(growth percentile) Weight(growth percentile) 135/79 (BP 1 Location: Right arm, BP Patient Position: Sitting) 85 98 °F (36.7 °C) (Oral) 18 5' 10\" (1.778 m) 203 lb 9.6 oz (92.4 kg) SpO2 BMI OB Status Smoking Status 97% 29.21 kg/m2 Having regular periods Current Every Day Smoker Vitals History BMI and BSA Data Body Mass Index Body Surface Area  
 29.21 kg/m 2 2.14 m 2 Preferred Pharmacy Pharmacy Name Phone [address] [facility], [address] [place] [address] [phone] Your Updated Medication List  
  
   
This list is accurate as of: 2/7/18  3:46 PM.  Always use your most recent med list. amLODIPine 10 mg tablet Commonly known as:  Evelin Acharya Take 1 Tab by mouth daily. ergocalciferol 50,000 unit capsule Commonly known as:  ERGOCALCIFEROL Take 1 Cap by mouth every seven (7) days. fenofibrate nanocrystallized 145 mg tablet Commonly known as:  Norleen Fleischer Take 1 Tab by mouth daily. ferrous sulfate 325 mg (65 mg iron) EC tablet Commonly known as:  IRON Take 1 Tab by mouth two (2) times a day. itraconazole 100 mg capsule Commonly known as:  SPORONAX  
200 mg twice daily for one week per month for three months  
  
 metroNIDAZOLE 500 mg tablet Commonly known as:  FLAGYL Take 1 Tab by mouth two (2) times a day for 7 days. Prescriptions Sent to Pharmacy Refills  
 metroNIDAZOLE (FLAGYL) 500 mg tablet 0 Sig: Take 1 Tab by mouth two (2) times a day for 7 days. Class: Normal  
 Pharmacy: [facility] [address], [address] Ph #: [phone] Route: Oral  
 itraconazole (SPORONAX) 100 mg capsule 2 Si mg twice daily for one week per month for three months Class: Normal  
 Pharmacy: [facility] [address], [address] Ph #: [phone] We Performed the Following [address] [id number] Custom] Introducing [facility] & HEALTH SERVICES! Shelly Epstein introduces [employer] patient portal. Now you can access parts of your medical record, email your doctor's office, and request medication refills online. 1. In your internet browser, go to [email or web address] 2. Click on the First Time User? Click Here link in the Sign In box. You will see the New Member Sign Up page. 3. Enter your [employer] Access Code exactly as it appears below. You will not need to use this code after youve completed the sign-up process. If you do not sign up before the expiration date, you must request a new code. · [employer] Access Code: [email or web address] Expires: 2018  3:46 PM 
 
4.  Enter the last four digits of your Social Security Number (xxxx) and Date of Birth (mm/dd/yyyy) as indicated and click Submit. You will be taken to the next sign-up page. 5. Create a DynamicOps ID. This will be your [employer] login ID and cannot be changed, so think of one that is secure and easy to remember. 6. Create a [employer] password. You can change your password at any time. 7. Enter your Password Reset Question and Answer. This can be used at a later time if you forget your password. 8. Enter your e-mail address. You will receive e-mail notification when new information is available in [address] 9. Click Sign Up. You can now view and download portions of your medical record. 10. Click the Download Summary menu link to download a portable copy of your medical information. If you have questions, please visit the Frequently Asked Questions section of the [employer] website. Remember, [employer] is NOT to be used for urgent needs. For medical emergencies, dial 911. Now available from your iPhone and Android! Please provide this summary of care documentation to your next provider. Your primary care clinician is listed as ABAD CHAN. If you have any questions after today's visit, please call [phone].

## 2018-02-07 NOTE — PROGRESS NOTES
Chief Complaint   Patient presents with    Yeast Infection     x's 1 week    Nail Problem    Elbow Pain     Pt seen in the office today for possible yeast infection  Pt reports the discharge is \"thick and white\". Denies itching or burning  Pt also requesting a different pill for her toe fungus. She reports an allergic reaction to previous medication for which she is requesting. Also states she is having elbow pain. Reports the elbow is tender to touch     Subjective: (As above and below)     Chief Complaint   Patient presents with    Yeast Infection     x's 1 week    Nail Problem    Elbow Pain     she is a 36y.o. year old female who presents for evaluation. Reviewed PmHx, RxHx, FmHx, SocHx, AllgHx and updated in chart. Review of Systems - negative except as listed above    Objective:     Vitals:    02/07/18 1504   BP: 135/79   Pulse: 85   Resp: 18   Temp: 98 °F (36.7 °C)   TempSrc: Oral   SpO2: 97%   Weight: 203 lb 9.6 oz (92.4 kg)   Height: 5' 10\" (1.778 m)     Physical Examination: General appearance - alert, well appearing, and in no distress  Mental status - normal mood, behavior, speech, dress, motor activity, and thought processes  Mouth - mucous membranes moist, pharynx normal without lesions  Chest - clear to auscultation, no wheezes, rales or rhonchi, symmetric air entry  Heart - normal rate, regular rhythm, normal S1, S2, no murmurs, rubs, clicks or gallops  Musculoskeletal - no joint tenderness, deformity or swelling  Extremities - onychomycosis toenails and right thumbnail    Assessment/ Plan:   1. Onychomycosis  -treat with alternate medication due to Lamisil allergy    2. Vaginal discharge  - NUSWAB VAGINITIS PLUS     Follow-up Disposition: As needed  I have discussed the diagnosis with the patient and the intended plan as seen in the above orders. The patient has received an after-visit summary and questions were answered concerning future plans.      Medication Side Effects and Warnings were discussed with patient: yes  Patient Labs were reviewed: yes  Patient Past Records were reviewed:  yes    Monserrat Peterson M.D.

## 2018-02-09 LAB
A VAGINAE DNA VAG QL NAA+PROBE: ABNORMAL SCORE
BVAB2 DNA VAG QL NAA+PROBE: ABNORMAL SCORE
C ALBICANS DNA VAG QL NAA+PROBE: NEGATIVE
C GLABRATA DNA VAG QL NAA+PROBE: NEGATIVE
C TRACH RRNA SPEC QL NAA+PROBE: NEGATIVE
MEGA1 DNA VAG QL NAA+PROBE: ABNORMAL SCORE
N GONORRHOEA RRNA SPEC QL NAA+PROBE: NEGATIVE
T VAGINALIS RRNA SPEC QL NAA+PROBE: NEGATIVE

## 2018-02-16 ENCOUNTER — TELEPHONE (OUTPATIENT)
Dept: FAMILY MEDICINE CLINIC | Age: 43
End: 2018-02-16

## 2018-04-27 DIAGNOSIS — E78.1 HYPERTRIGLYCERIDEMIA: ICD-10-CM

## 2018-04-27 RX ORDER — FENOFIBRATE 145 MG/1
TABLET, COATED ORAL
Qty: 30 TAB | Refills: 0 | Status: SHIPPED | OUTPATIENT
Start: 2018-04-27 | End: 2018-05-18 | Stop reason: SDUPTHER

## 2018-04-27 NOTE — TELEPHONE ENCOUNTER
1 month supply of medication approved. Pt is overdue to follow up in office for fasting labs. No more med refills until OV, please advise.

## 2018-05-18 DIAGNOSIS — E78.1 HYPERTRIGLYCERIDEMIA: ICD-10-CM

## 2018-05-18 RX ORDER — FENOFIBRATE 145 MG/1
TABLET, COATED ORAL
Qty: 30 TAB | Refills: 2 | Status: SHIPPED | OUTPATIENT
Start: 2018-05-18

## 2018-05-18 RX ORDER — AMLODIPINE BESYLATE 10 MG/1
10 TABLET ORAL DAILY
Qty: 30 TAB | Refills: 2 | Status: SHIPPED | OUTPATIENT
Start: 2018-05-18

## 2018-05-18 NOTE — TELEPHONE ENCOUNTER
----- Message from Judy Stout sent at 5/18/2018  7:35 AM EDT -----  Regarding: CHITO Hull/Refill  Pt is requesting a refill on Amlodipine 10-mg and Fenofibrate 145-mg sent to the [facility] on file.

## 2018-05-29 ENCOUNTER — OFFICE VISIT (OUTPATIENT)
Dept: FAMILY MEDICINE CLINIC | Age: 43
End: 2018-05-29

## 2018-05-29 VITALS
HEART RATE: 92 BPM | HEIGHT: 70 IN | BODY MASS INDEX: 28.63 KG/M2 | TEMPERATURE: 97.9 F | OXYGEN SATURATION: 98 % | SYSTOLIC BLOOD PRESSURE: 124 MMHG | DIASTOLIC BLOOD PRESSURE: 86 MMHG | RESPIRATION RATE: 18 BRPM | WEIGHT: 200 LBS

## 2018-05-29 DIAGNOSIS — E78.1 HYPERTRIGLYCERIDEMIA: ICD-10-CM

## 2018-05-29 DIAGNOSIS — I10 ESSENTIAL HYPERTENSION: ICD-10-CM

## 2018-05-29 DIAGNOSIS — Z13.29 SCREENING FOR THYROID DISORDER: ICD-10-CM

## 2018-05-29 DIAGNOSIS — H69.82 ETD (EUSTACHIAN TUBE DYSFUNCTION), LEFT: ICD-10-CM

## 2018-05-29 DIAGNOSIS — Z00.00 ENCOUNTER FOR ANNUAL PHYSICAL EXAM: Primary | ICD-10-CM

## 2018-05-29 RX ORDER — FLUTICASONE PROPIONATE 50 MCG
2 SPRAY, SUSPENSION (ML) NASAL DAILY
Qty: 1 BOTTLE | Refills: 2 | Status: SHIPPED | OUTPATIENT
Start: 2018-05-29 | End: 2022-10-25

## 2018-05-29 NOTE — PROGRESS NOTES
Chief Complaint   Patient presents with    Hypertension    Cholesterol Problem    Labs    Ear Fullness     Patient in office today for f/u and fasting labs, pt is not fasting. Have c/o of left ear fullness that began one month ago. 1. Have you been to the ER, urgent care clinic since your last visit? Hospitalized since your last visit? No    2. Have you seen or consulted any other health care providers outside of the [address] [place] [address] since your last visit? Include any pap smears or colon screening.  No

## 2018-05-29 NOTE — MR AVS SNAPSHOT
[address] [address] 
[phone] Patient: Heidi Basilio MRN: [id number] Visit Information Date & Time Provider Department Dept. Phone Encounter #  
 5/29/2018  8:00 AM Aliyah Gross NP [facility] [phone] [id number] Follow-up Instructions Return if symptoms worsen or fail to improve. Upcoming Health Maintenance Date Due Pneumococcal 19-64 Medium Risk (1 of 1 - PPSV23) 11/10/1993 DTaP/Tdap/Td series (1 - Tdap) 11/10/1995 PAP AKA CERVICAL CYTOLOGY 11/10/1995 Influenza Age 5 to Adult 8/1/2018 Allergies as of 5/29/2018  Review Complete On: 5/29/2018 By: Aliyah Gross NP Severity Noted Reaction Type Reactions Ciprofloxacin High 02/17/2016    Swelling Lisinopril  11/14/2017    Other (comments) Current Immunizations  Reviewed on 3/8/2016 No immunizations on file. Not reviewed this visit You Were Diagnosed With   
  
 Codes Comments Encounter for annual physical exam    -  Primary ICD-10-CM: Z00.00 ICD-9-CM: V70.0 Hypertriglyceridemia     ICD-10-CM: E78.1 ICD-9-CM: 272.1 Essential hypertension     ICD-10-CM: I10 
ICD-9-CM: 401.9 ETD (Eustachian tube dysfunction), left     ICD-10-CM: G58.47 ICD-9-CM: 381.81 Screening for thyroid disorder     ICD-10-CM: Z13.29 ICD-9-CM: V77.0 Vitals BP Pulse Temp Resp Height(growth percentile) Weight(growth percentile) 124/86 (BP 1 Location: Right arm, BP Patient Position: Sitting) 92 97.9 °F (36.6 °C) (Oral) 18 5' 10\" (1.778 m) 200 lb (90.7 kg) LMP SpO2 BMI OB Status Smoking Status 05/26/2018 98% 28.7 kg/m2 Having regular periods Current Every Day Smoker Vitals History BMI and BSA Data Body Mass Index Body Surface Area 28.7 kg/m 2 2.12 m 2 Preferred Pharmacy Pharmacy Name Phone  [facility], [address] [facility] [phone] Your Updated Medication List  
  
   
This list is accurate as of 18  8:21 AM.  Always use your most recent med list. amLODIPine 10 mg tablet Commonly known as:  Jacquetta Couch Take 1 Tab by mouth daily. fenofibrate nanocrystallized 145 mg tablet Commonly known as:  TRICOR  
TAKE ONE TABLET BY MOUTH ONCE DAILY ferrous sulfate 325 mg (65 mg iron) EC tablet Commonly known as:  IRON Take 1 Tab by mouth two (2) times a day. fluticasone 50 mcg/actuation nasal spray Commonly known as:  Lovetta End 2 Sprays by Both Nostrils route daily. Prescriptions Sent to Pharmacy Refills  
 fluticasone (FLONASE) 50 mcg/actuation nasal spray 2 Si Sprays by Both Nostrils route daily. Class: Normal  
 Pharmacy: [facility] [address], [address] Ph #: [phone] Route: Both Nostrils We Performed the Following CBC WITH AUTOMATED DIFF [15244 CPT(R)] LIPID PANEL [53841 CPT(R)] METABOLIC PANEL, COMPREHENSIVE [73268 CPT(R)] TSH 3RD GENERATION [76669 CPT(R)] Follow-up Instructions Return if symptoms worsen or fail to improve. Patient Instructions Eustachian Tube Problems: Care Instructions Your Care Instructions The eustachian (say \"you-STAY-shee-un\") tubes run between the inside of the ears and the throat. They keep air pressure stable in the ears. If your eustachian tubes become blocked, the air pressure in your ears changes. The fluids from a cold can clog eustachian tubes, causing pain in the ears. A quick change in air pressure can cause eustachian tubes to close up. This might happen when an airplane changes altitude or when a  goes up or down underwater.  
Eustachian tube problems often clear up on their own or after antibiotic treatment. If your tubes continue to be blocked, you may need surgery. Follow-up care is a key part of your treatment and safety. Be sure to make and go to all appointments, and call your doctor if you are having problems. It's also a good idea to know your test results and keep a list of the medicines you take. How can you care for yourself at home? · To ease ear pain, apply a warm washcloth or a heating pad set on low. There may be some drainage from the ear when the heat melts earwax. Put a cloth between the heat source and your skin. Do not use a heating pad with children. · If your doctor prescribed antibiotics, take them as directed. Do not stop taking them just because you feel better. You need to take the full course of antibiotics. · Your doctor may recommend over-the-counter medicine. Be safe with medicines. Oral or nasal decongestants may relieve ear pain. Avoid decongestants that are combined with antihistamines, which tend to cause more blockage. But if allergies seem to be the problem, your doctor may recommend a combination. Be careful with cough and cold medicines. Don't give them to children younger than 6, because they don't work for children that age and can even be harmful. For children 6 and older, always follow all the instructions carefully. Make sure you know how much medicine to give and how long to use it. And use the dosing device if one is included. When should you call for help? Call your doctor now or seek immediate medical care if: 
? · You develop sudden, complete hearing loss. ? · You have severe pain or feel dizzy. ? · You have new or increasing pus or blood draining from your ear. ? · You have redness, swelling, or pain around or behind the ear. ? Watch closely for changes in your health, and be sure to contact your doctor if: 
? · You do not get better after 2 weeks. ? · You have any new symptoms, such as itching or a feeling of fullness in the ear. Where can you learn more? Go to http://barbie-claire.info/. Enter Y822 in the search box to learn more about \"Eustachian Tube Problems: Care Instructions. \" Current as of: May 12, 2017 Content Version: 11.4 © 9101-1052 Healthwise, Incorporated. Care instructions adapted under license by [employer] (which disclaims liability or warranty for this information). If you have questions about a medical condition or this instruction, always ask your healthcare professional. [address] any warranty or liability for your use of this information. Introducing [facility] & HEALTH SERVICES! [facility] introduces [employer] patient portal. Now you can access parts of your medical record, email your doctor's office, and request medication refills online. 1. In your internet browser, go to [email or web address] 2. Click on the First Time User? Click Here link in the Sign In box. You will see the New Member Sign Up page. 3. Enter your [employer] Access Code exactly as it appears below. You will not need to use this code after youve completed the sign-up process. If you do not sign up before the expiration date, you must request a new code. · [employer] Access Code: [email or web address] Expires: 8/27/2018  8:21 AM 
 
4. Enter the last four digits of your Social Security Number (xxxx) and Date of Birth (mm/dd/yyyy) as indicated and click Submit. You will be taken to the next sign-up page. 5. Create a [employer] ID. This will be your InnoPad login ID and cannot be changed, so think of one that is secure and easy to remember. 6. Create a InnoPad password. You can change your password at any time. 7. Enter your Password Reset Question and Answer. This can be used at a later time if you forget your password. 8. Enter your e-mail address. You will receive e-mail notification when new information is available in [address] 9. Click Sign Up. You can now view and download portions of your medical record. 10. Click the Download Summary menu link to download a portable copy of your medical information. If you have questions, please visit the Frequently Asked Questions section of the [employer] website. Remember, [employer] is NOT to be used for urgent needs. For medical emergencies, dial 911. Now available from your iPhone and Android! Please provide this summary of care documentation to your next provider. Your primary care clinician is listed as ABAD CHAN. If you have any questions after today's visit, please call [phone].

## 2018-05-29 NOTE — PROGRESS NOTES
Chief Complaint   Patient presents with    Hypertension    Cholesterol Problem    Labs    Ear Fullness     Patient in office today for f/u and fasting labs, pt is fasting. Only a few gulps of sweet tea around 7:30. Taking her medications daily as prescribed. Denies any cp, sob, and dyspnea. Health Maintenance Due   Topic Date Due    Pneumococcal 19-64 Medium Risk (1 of 1 - PPSV23) 11/10/1993    DTaP/Tdap/Td series (1 - Tdap) 11/10/1995    PAP AKA CERVICAL CYTOLOGY  11/10/1995     Seeing OBGYN for her well woman needs. Last pap was approx 1 year ago. [facility] at [place]. Have c/o of left ear fullness that began one month ago. Will hear water in the ear. Comes and goes. This has been present for years. Hearing a crackling sensation when she swallows. \"feels weird. \" Denies any pain. Denies any sinus congestion or pressure. Denies any other concerns at this time. Chief Complaint   Patient presents with    Hypertension    Cholesterol Problem    Labs    Ear Fullness     she is a 36y.o. year old female who presents for evalution. Reviewed PmHx, RxHx, FmHx, SocHx, AllgHx and updated and dated in the chart.     Review of Systems - negative except as listed above in the HPI    Objective:     Vitals:    05/29/18 0756   BP: 124/86   Pulse: 92   Resp: 18   Temp: 97.9 °F (36.6 °C)   TempSrc: Oral   SpO2: 98%   Weight: 200 lb (90.7 kg)   Height: 5' 10\" (1.778 m)     Physical Examination: General appearance - alert, well appearing, and in no distress  Eyes - pupils equal and reactive, extraocular eye movements intact  Ears - bilateral TM's and external ear canals normal; trace fluid present behind left TM, some ceruminosis but not obstructed  Nose - normal and patent, no erythema, discharge or polyps and normal nontender sinuses  Mouth - mucous membranes moist, pharynx normal without lesions  Neck - supple, no significant adenopathy, carotids upstroke normal bilaterally, no bruits, thyroid exam: thyroid is normal in size without nodules or tenderness  Chest - clear to auscultation, no wheezes, rales or rhonchi, symmetric air entry  Heart - normal rate, regular rhythm, normal S1, S2, no murmurs  Extremities - peripheral pulses normal, no ankle edema, no clubbing or cyanosis  Skin - normal coloration and turgor, no rashes, no suspicious skin lesions noted    Assessment/ Plan:   Diagnoses and all orders for this visit:    1. Encounter for annual physical exam  -     LIPID PANEL  -     METABOLIC PANEL, COMPREHENSIVE  -     CBC WITH AUTOMATED DIFF  -     TSH 3RD GENERATION    2. Hypertriglyceridemia  Will notify results and deviate plan based on findings. Continue tricor daily and heart healthy diet. 3. Essential hypertension  BP at goal on med regimen. 4. ETD (Eustachian tube dysfunction), left  -     fluticasone (FLONASE) 50 mcg/actuation nasal spray; 2 Sprays by Both Nostrils route daily. Use as directed. Reviewed SEs/ADRs of medication. Follow up if sx persist or worsen. 5. Screening for thyroid disorder  -     TSH 3RD GENERATION  Screening, asx. Follow-up Disposition:  Return if symptoms worsen or fail to improve. I have discussed the diagnosis with the patient and the intended plan as seen in the above orders. The patient has received an after-visit summary and questions were answered concerning future plans. Medication Side Effects and Warnings were discussed with patient: yes  Patient Labs were reviewed and or requested: yes  Patient Past Records were reviewed and or requested  yes  Patient / Caregiver Understanding of treatment plan was verbalized during office visit YES    KISHOR Dan    Patient Instructions          Eustachian Tube Problems: Care Instructions  Your Care Instructions    The eustachian (say \"you-STAY-shee-un\") tubes run between the inside of the ears and the throat. They keep air pressure stable in the ears.  If your eustachian tubes become blocked, the air pressure in your ears changes. The fluids from a cold can clog eustachian tubes, causing pain in the ears. A quick change in air pressure can cause eustachian tubes to close up. This might happen when an airplane changes altitude or when a  goes up or down underwater. Eustachian tube problems often clear up on their own or after antibiotic treatment. If your tubes continue to be blocked, you may need surgery. Follow-up care is a key part of your treatment and safety. Be sure to make and go to all appointments, and call your doctor if you are having problems. It's also a good idea to know your test results and keep a list of the medicines you take. How can you care for yourself at home? · To ease ear pain, apply a warm washcloth or a heating pad set on low. There may be some drainage from the ear when the heat melts earwax. Put a cloth between the heat source and your skin. Do not use a heating pad with children. · If your doctor prescribed antibiotics, take them as directed. Do not stop taking them just because you feel better. You need to take the full course of antibiotics. · Your doctor may recommend over-the-counter medicine. Be safe with medicines. Oral or nasal decongestants may relieve ear pain. Avoid decongestants that are combined with antihistamines, which tend to cause more blockage. But if allergies seem to be the problem, your doctor may recommend a combination. Be careful with cough and cold medicines. Don't give them to children younger than 6, because they don't work for children that age and can even be harmful. For children 6 and older, always follow all the instructions carefully. Make sure you know how much medicine to give and how long to use it. And use the dosing device if one is included. When should you call for help? Call your doctor now or seek immediate medical care if:  ? · You develop sudden, complete hearing loss. ? · You have severe pain or feel dizzy.    ? · You have new or increasing pus or blood draining from your ear. ? · You have redness, swelling, or pain around or behind the ear. ? Watch closely for changes in your health, and be sure to contact your doctor if:  ? · You do not get better after 2 weeks. ? · You have any new symptoms, such as itching or a feeling of fullness in the ear. Where can you learn more? Go to http://barbie-claire.info/. Enter Y822 in the search box to learn more about \"Eustachian Tube Problems: Care Instructions. \"  Current as of: May 12, 2017  Content Version: 11.4  © 1833-9163 [employer]. Care instructions adapted under license by [employer] (which disclaims liability or warranty for this information). If you have questions about a medical condition or this instruction, always ask your healthcare professional. [address] any warranty or liability for your use of this information.

## 2018-05-29 NOTE — PATIENT INSTRUCTIONS
Eustachian Tube Problems: Care Instructions  Your Care Instructions    The eustachian (say \"you-STAY-shee-un\") tubes run between the inside of the ears and the throat. They keep air pressure stable in the ears. If your eustachian tubes become blocked, the air pressure in your ears changes. The fluids from a cold can clog eustachian tubes, causing pain in the ears. A quick change in air pressure can cause eustachian tubes to close up. This might happen when an airplane changes altitude or when a  goes up or down underwater. Eustachian tube problems often clear up on their own or after antibiotic treatment. If your tubes continue to be blocked, you may need surgery. Follow-up care is a key part of your treatment and safety. Be sure to make and go to all appointments, and call your doctor if you are having problems. It's also a good idea to know your test results and keep a list of the medicines you take. How can you care for yourself at home? · To ease ear pain, apply a warm washcloth or a heating pad set on low. There may be some drainage from the ear when the heat melts earwax. Put a cloth between the heat source and your skin. Do not use a heating pad with children. · If your doctor prescribed antibiotics, take them as directed. Do not stop taking them just because you feel better. You need to take the full course of antibiotics. · Your doctor may recommend over-the-counter medicine. Be safe with medicines. Oral or nasal decongestants may relieve ear pain. Avoid decongestants that are combined with antihistamines, which tend to cause more blockage. But if allergies seem to be the problem, your doctor may recommend a combination. Be careful with cough and cold medicines. Don't give them to children younger than 6, because they don't work for children that age and can even be harmful. For children 6 and older, always follow all the instructions carefully.  Make sure you know how much medicine to give and how long to use it. And use the dosing device if one is included. When should you call for help? Call your doctor now or seek immediate medical care if:  ? · You develop sudden, complete hearing loss. ? · You have severe pain or feel dizzy. ? · You have new or increasing pus or blood draining from your ear. ? · You have redness, swelling, or pain around or behind the ear. ? Watch closely for changes in your health, and be sure to contact your doctor if:  ? · You do not get better after 2 weeks. ? · You have any new symptoms, such as itching or a feeling of fullness in the ear. Where can you learn more? Go to http://barbie-claire.info/. Enter Y822 in the search box to learn more about \"Eustachian Tube Problems: Care Instructions. \"  Current as of: May 12, 2017  Content Version: 11.4  © 6888-6325 [employer]. Care instructions adapted under license by [employer] (which disclaims liability or warranty for this information). If you have questions about a medical condition or this instruction, always ask your healthcare professional. [address] any warranty or liability for your use of this information.

## 2018-05-30 LAB
ALBUMIN SERPL-MCNC: 4.4 G/DL (ref 3.5–5.5)
ALBUMIN/GLOB SERPL: 1.6 {RATIO} (ref 1.2–2.2)
ALP SERPL-CCNC: 46 IU/L (ref 39–117)
ALT SERPL-CCNC: 18 IU/L (ref 0–32)
AST SERPL-CCNC: 20 IU/L (ref 0–40)
BASOPHILS # BLD AUTO: 0.1 X10E3/UL (ref 0–0.2)
BASOPHILS NFR BLD AUTO: 1 %
BILIRUB SERPL-MCNC: <0.2 MG/DL (ref 0–1.2)
BUN SERPL-MCNC: 11 MG/DL (ref 6–24)
BUN/CREAT SERPL: 14 (ref 9–23)
CALCIUM SERPL-MCNC: 9.5 MG/DL (ref 8.7–10.2)
CHLORIDE SERPL-SCNC: 108 MMOL/L (ref 96–106)
CHOLEST SERPL-MCNC: 157 MG/DL (ref 100–199)
CO2 SERPL-SCNC: 15 MMOL/L (ref 18–29)
CREAT SERPL-MCNC: 0.79 MG/DL (ref 0.57–1)
EOSINOPHIL # BLD AUTO: 0.5 X10E3/UL (ref 0–0.4)
EOSINOPHIL NFR BLD AUTO: 5 %
ERYTHROCYTE [DISTWIDTH] IN BLOOD BY AUTOMATED COUNT: 13.5 % (ref 12.3–15.4)
GFR SERPLBLD CREATININE-BSD FMLA CKD-EPI: 106 ML/MIN/1.73
GFR SERPLBLD CREATININE-BSD FMLA CKD-EPI: 92 ML/MIN/1.73
GLOBULIN SER CALC-MCNC: 2.8 G/DL (ref 1.5–4.5)
GLUCOSE SERPL-MCNC: 126 MG/DL (ref 65–99)
HCT VFR BLD AUTO: 41 % (ref 34–46.6)
HDLC SERPL-MCNC: 26 MG/DL
HGB BLD-MCNC: 13.8 G/DL (ref 11.1–15.9)
IMM GRANULOCYTES # BLD: 0 X10E3/UL (ref 0–0.1)
IMM GRANULOCYTES NFR BLD: 0 %
INTERPRETATION, 910389: NORMAL
LDLC SERPL CALC-MCNC: ABNORMAL MG/DL (ref 0–99)
LYMPHOCYTES # BLD AUTO: 2.7 X10E3/UL (ref 0.7–3.1)
LYMPHOCYTES NFR BLD AUTO: 27 %
MCH RBC QN AUTO: 32.1 PG (ref 26.6–33)
MCHC RBC AUTO-ENTMCNC: 33.7 G/DL (ref 31.5–35.7)
MCV RBC AUTO: 95 FL (ref 79–97)
MONOCYTES # BLD AUTO: 0.5 X10E3/UL (ref 0.1–0.9)
MONOCYTES NFR BLD AUTO: 5 %
NEUTROPHILS # BLD AUTO: 6.2 X10E3/UL (ref 1.4–7)
NEUTROPHILS NFR BLD AUTO: 62 %
PDF IMAGE, 910387: NORMAL
PLATELET # BLD AUTO: 490 X10E3/UL (ref 150–379)
POTASSIUM SERPL-SCNC: 4.4 MMOL/L (ref 3.5–5.2)
PROT SERPL-MCNC: 7.2 G/DL (ref 6–8.5)
RBC # BLD AUTO: 4.3 X10E6/UL (ref 3.77–5.28)
SODIUM SERPL-SCNC: 141 MMOL/L (ref 134–144)
TRIGL SERPL-MCNC: 454 MG/DL (ref 0–149)
TSH SERPL DL<=0.005 MIU/L-ACNC: 1.3 UIU/ML (ref 0.45–4.5)
VLDLC SERPL CALC-MCNC: ABNORMAL MG/DL (ref 5–40)
WBC # BLD AUTO: 10 X10E3/UL (ref 3.4–10.8)

## 2018-05-31 DIAGNOSIS — E78.1 HYPERTRIGLYCERIDEMIA: Primary | ICD-10-CM

## 2018-05-31 RX ORDER — SIMVASTATIN 10 MG/1
10 TABLET, FILM COATED ORAL
Qty: 30 TAB | Refills: 2 | Status: SHIPPED | OUTPATIENT
Start: 2018-05-31

## 2018-05-31 NOTE — PROGRESS NOTES
Please notify pt the followin. Cholesterol shows that her TGS remain elevated, even with taking Tricor daily. THerefore I am going to recommend we add another medication to regimen. I would like pt to take 10 mg zocor with the Tricor daily and follow up in 4-6 weeks to repeat fasting labs. 2. Her fasting glucose was elevated. Please add on hemoglobin a1c diagnosis code R73.9 to labs to check for diabetes. Will notify pt when I receive and review those results. Start Zocor. Continue Tricor. Follow up in 4-6 weeks fasting to repeat labs.

## 2018-06-02 LAB
HBA1C MFR BLD: 5.3 % (ref 4.8–5.6)
SPECIMEN STATUS REPORT, ROLRST: NORMAL

## 2018-06-03 NOTE — PROGRESS NOTES
Please notify pt that add on hemoglobin a1c is normal suggesting pt does not have prediabetes or early type 2 diabetes.

## 2018-06-14 ENCOUNTER — OFFICE VISIT (OUTPATIENT)
Dept: FAMILY MEDICINE CLINIC | Age: 43
End: 2018-06-14

## 2018-06-14 VITALS
HEIGHT: 70 IN | SYSTOLIC BLOOD PRESSURE: 126 MMHG | TEMPERATURE: 98.4 F | WEIGHT: 201 LBS | HEART RATE: 84 BPM | BODY MASS INDEX: 28.77 KG/M2 | RESPIRATION RATE: 16 BRPM | DIASTOLIC BLOOD PRESSURE: 77 MMHG | OXYGEN SATURATION: 98 %

## 2018-06-14 DIAGNOSIS — Z23 ENCOUNTER FOR IMMUNIZATION: ICD-10-CM

## 2018-06-14 DIAGNOSIS — S91.332A PUNCTURE WOUND OF LEFT FOOT, INITIAL ENCOUNTER: ICD-10-CM

## 2018-06-14 DIAGNOSIS — M79.672 LEFT FOOT PAIN: Primary | ICD-10-CM

## 2018-06-14 RX ORDER — CEPHALEXIN 500 MG/1
1000 CAPSULE ORAL 2 TIMES DAILY
Qty: 40 CAP | Refills: 0 | Status: SHIPPED | OUTPATIENT
Start: 2018-06-14 | End: 2018-06-24

## 2018-06-14 RX ORDER — MULTIVITAMIN WITH IRON
1 TABLET ORAL DAILY
COMMUNITY

## 2018-06-14 NOTE — MR AVS SNAPSHOT
[address] [address] 
[phone] Patient: Juana Desai MRN: [id number] OHO:44/[id number] Visit Information Date & Time Provider Department Dept. Phone Encounter #  
 6/14/2018  1:40 PM Jacobo Quinonez MD [address] [facility] [phone] [id number] Follow-up Instructions Return if symptoms worsen or fail to improve. Upcoming Health Maintenance Date Due Pneumococcal 19-64 Medium Risk (1 of 1 - PPSV23) 11/10/1993 DTaP/Tdap/Td series (1 - Tdap) 11/10/1995 PAP AKA CERVICAL CYTOLOGY 11/10/1995 Influenza Age 5 to Adult 8/1/2018 Allergies as of 6/14/2018  Review Complete On: 6/14/2018 By: Jacobo Quinonez MD  
  
 Severity Noted Reaction Type Reactions Ciprofloxacin High 02/17/2016    Swelling Lisinopril  11/14/2017    Other (comments) Current Immunizations  Reviewed on 3/8/2016 Name Date Tdap  Incomplete Not reviewed this visit You Were Diagnosed With   
  
 Codes Comments Left foot pain    -  Primary ICD-10-CM: G32.910 ICD-9-CM: 729.5 Puncture wound of left foot, initial encounter     ICD-10-CM: E28.012V ICD-9-CM: 892.0 Encounter for immunization     ICD-10-CM: N07 ICD-9-CM: V03.89 Vitals BP Pulse Temp Resp Height(growth percentile) Weight(growth percentile) 126/77 84 98.4 °F (36.9 °C) (Oral) 16 5' 10\" (1.778 m) 201 lb (91.2 kg) LMP SpO2 BMI OB Status Smoking Status 05/26/2018 98% 28.84 kg/m2 Having regular periods Current Every Day Smoker BMI and BSA Data Body Mass Index Body Surface Area  
 28.84 kg/m 2 2.12 m 2 Preferred Pharmacy Pharmacy Name Phone [address] [facility], [address] [address] [address] [facility] [phone] Your Updated Medication List  
  
   
This list is accurate as of 6/14/18  1:59 PM.  Always use your most recent med list. amLODIPine 10 mg tablet Commonly known as:  Pranay Shield Take 1 Tab by mouth daily. cephALEXin 500 mg capsule Commonly known as:  Kennieth Labs Take 2 Caps by mouth two (2) times a day for 10 days. fenofibrate nanocrystallized 145 mg tablet Commonly known as:  TRICOR  
TAKE ONE TABLET BY MOUTH ONCE DAILY ferrous sulfate 325 mg (65 mg iron) EC tablet Commonly known as:  IRON Take 1 Tab by mouth two (2) times a day. fluticasone 50 mcg/actuation nasal spray Commonly known as:  Deetta Britain 2 Sprays by Both Nostrils route daily. multivitamin with iron tablet Take 1 Tab by mouth daily. simvastatin 10 mg tablet Commonly known as:  ZOCOR Take 1 Tab by mouth nightly. Prescriptions Sent to Pharmacy Refills  
 cephALEXin (KEFLEX) 500 mg capsule 0 Sig: Take 2 Caps by mouth two (2) times a day for 10 days. Class: Normal  
 Pharmacy: [facility] [address], [address] Ph #: [phone] Route: Oral  
  
We Performed the Following TETANUS, DIPHTHERIA TOXOIDS AND ACELLULAR PERTUSSIS VACCINE (TDAP), IN INDIVIDS. >=7, IM [id number] CPT(R)] Follow-up Instructions Return if symptoms worsen or fail to improve. Introducing [facility]! Eugenio Vang introduces Access MediQuip patient portal. Now you can access parts of your medical record, email your doctor's office, and request medication refills online. 1. In your internet browser, go to [email or web address] 2. Click on the First Time User? Click Here link in the Sign In box. You will see the New Member Sign Up page. 3. Enter your Access MediQuip Access Code exactly as it appears below. You will not need to use this code after youve completed the sign-up process. If you do not sign up before the expiration date, you must request a new code. · Access MediQuip Access Code: [email or web address] Expires: 8/27/2018  8:21 AM 
 
 4. Enter the last four digits of your Social Security Number (xxxx) and Date of Birth (mm/dd/yyyy) as indicated and click Submit. You will be taken to the next sign-up page. 5. Create a MetricStream ID. This will be your MetricStream login ID and cannot be changed, so think of one that is secure and easy to remember. 6. Create a MetricStream password. You can change your password at any time. 7. Enter your Password Reset Question and Answer. This can be used at a later time if you forget your password. 8. Enter your e-mail address. You will receive e-mail notification when new information is available in [address] 9. Click Sign Up. You can now view and download portions of your medical record. 10. Click the Download Summary menu link to download a portable copy of your medical information. If you have questions, please visit the Frequently Asked Questions section of the MetricStream website. Remember, MetricStream is NOT to be used for urgent needs. For medical emergencies, dial 911. Now available from your iPhone and Android! Please provide this summary of care documentation to your next provider. Your primary care clinician is listed as ABAD CHAN. If you have any questions after today's visit, please call [phone].

## 2018-06-14 NOTE — PROGRESS NOTES
Chief Complaint   Patient presents with    Foot Injury     Nail puncture in Left foot. Today 2 hours ago    Immunization/Injection     1. Have you been to the ER, urgent care clinic since your last visit? Hospitalized since your last visit? No    2. Have you seen or consulted any other health care providers outside of the [facility] since your last visit? Include any pap smears or colon screening. No        Chief Complaint   Patient presents with    Foot Injury     Nail puncture in Left foot. Today 2 hours ago    Immunization/Injection     She is a 36 y.o. female who presents for evalution. Reviewed PmHx, RxHx, FmHx, SocHx, AllgHx and updated and dated in the chart. Patient Active Problem List    Diagnosis    Hypertriglyceridemia    Iron deficiency anemia    Vitamin D deficiency       Review of Systems - negative except as listed above in the HPI    Objective:     Vitals:    06/14/18 1350   BP: 126/77   Pulse: 84   Resp: 16   Temp: 98.4 °F (36.9 °C)   TempSrc: Oral   SpO2: 98%   Weight: 201 lb (91.2 kg)   Height: 5' 10\" (1.778 m)     Physical Examination: General appearance - alert, well appearing, and in no distress  Left foot with 1mm wound    Assessment/ Plan:   Diagnoses and all orders for this visit:    1. Left foot pain  -see below    2. Puncture wound of left foot, initial encounter  -     cephALEXin (KEFLEX) 500 mg capsule; Take 2 Caps by mouth two (2) times a day for 10 days. 3. Encounter for immunization  -     Tetanus, diphtheria toxoids and acellular pertussis (TDAP) vaccine, in individuals >=7 years, IM       Follow-up Disposition:  Return if symptoms worsen or fail to improve. I have discussed the diagnosis with the patient and the intended plan as seen in the above orders. The patient understands and agrees with the plan. The patient has received an after-visit summary and questions were answered concerning future plans.      Medication Side Effects and Warnings were discussed with patient  Patient Labs were reviewed and or requested:  Patient Past Records were reviewed and or requested    Cabrera Herrera M.D. There are no Patient Instructions on file for this visit.

## 2018-07-19 ENCOUNTER — OFFICE VISIT (OUTPATIENT)
Dept: FAMILY MEDICINE CLINIC | Age: 43
End: 2018-07-19

## 2018-07-19 VITALS
TEMPERATURE: 98.1 F | HEART RATE: 85 BPM | SYSTOLIC BLOOD PRESSURE: 126 MMHG | RESPIRATION RATE: 14 BRPM | OXYGEN SATURATION: 97 % | HEIGHT: 70 IN | WEIGHT: 193.2 LBS | BODY MASS INDEX: 27.66 KG/M2 | DIASTOLIC BLOOD PRESSURE: 85 MMHG

## 2018-07-19 DIAGNOSIS — H66.90 ACUTE OTITIS MEDIA, UNSPECIFIED OTITIS MEDIA TYPE: Primary | ICD-10-CM

## 2018-07-19 DIAGNOSIS — J06.9 UPPER RESPIRATORY TRACT INFECTION, UNSPECIFIED TYPE: ICD-10-CM

## 2018-07-19 RX ORDER — AZITHROMYCIN 250 MG/1
TABLET, FILM COATED ORAL
Qty: 6 TAB | Refills: 0 | Status: SHIPPED | OUTPATIENT
Start: 2018-07-19 | End: 2022-10-25 | Stop reason: ALTCHOICE

## 2018-07-19 RX ORDER — AZITHROMYCIN 250 MG/1
TABLET, FILM COATED ORAL
Qty: 6 TAB | Refills: 0 | Status: SHIPPED | OUTPATIENT
Start: 2018-07-19 | End: 2018-07-19 | Stop reason: SDUPTHER

## 2018-07-19 NOTE — MR AVS SNAPSHOT
[address] [address] 
[phone] Patient: Pennie Foster MRN: [id number] NXC:46/54/4932 Visit Information Date & Time Provider Department Dept. Phone Encounter #  
 7/19/2018  9:45 AM Ashly Paul NP [address] [facility] [phone] [id number] Upcoming Health Maintenance Date Due Pneumococcal 19-64 Medium Risk (1 of 1 - PPSV23) 11/10/1993 Influenza Age 5 to Adult 8/1/2018 PAP AKA CERVICAL CYTOLOGY 4/8/2019 DTaP/Tdap/Td series (2 - Td) 6/14/2028 Allergies as of 7/19/2018  Review Complete On: 7/19/2018 By: Heri Lama Severity Noted Reaction Type Reactions Ciprofloxacin High 02/17/2016    Swelling Lisinopril  11/14/2017    Other (comments) Current Immunizations  Reviewed on 3/8/2016 Name Date Tdap 6/14/2018 Not reviewed this visit You Were Diagnosed With   
  
 Codes Comments Acute otitis media, unspecified otitis media type    -  Primary ICD-10-CM: H66.90 ICD-9-CM: 382. 9 Upper respiratory tract infection, unspecified type     ICD-10-CM: J06.9 ICD-9-CM: 465.9 Vitals BP Pulse Temp Resp Height(growth percentile) Weight(growth percentile) 126/85 (BP 1 Location: Left arm, BP Patient Position: Sitting) 85 98.1 °F (36.7 °C) (Oral) 14 5' 10\" (1.778 m) 193 lb 3.2 oz (87.6 kg) SpO2 BMI OB Status Smoking Status 97% 27.72 kg/m2 Having regular periods Current Every Day Smoker Vitals History BMI and BSA Data Body Mass Index Body Surface Area  
 27.72 kg/m 2 2.08 m 2 Preferred Pharmacy Pharmacy Name Phone [address] [facility], [address] [place] [address] [address] [facility] [phone] Your Updated Medication List  
  
   
This list is accurate as of 7/19/18 10:06 AM.  Always use your most recent med list. amLODIPine 10 mg tablet Commonly known as:  Blaine Hill Take 1 Tab by mouth daily. azithromycin 250 mg tablet Commonly known as:  Andrew Her Take two tablets today then one tablet daily  
  
 fenofibrate nanocrystallized 145 mg tablet Commonly known as:  TRICOR  
TAKE ONE TABLET BY MOUTH ONCE DAILY ferrous sulfate 325 mg (65 mg iron) EC tablet Commonly known as:  IRON Take 1 Tab by mouth two (2) times a day. fluticasone 50 mcg/actuation nasal spray Commonly known as:  Knott Annita 2 Sprays by Both Nostrils route daily. multivitamin with iron tablet Take 1 Tab by mouth daily. simvastatin 10 mg tablet Commonly known as:  ZOCOR Take 1 Tab by mouth nightly. Prescriptions Printed Refills  
 azithromycin (ZITHROMAX Z-DANN) 250 mg tablet 0 Sig: Take two tablets today then one tablet daily Class: Print Introducing [facility]! [facility] introduces Marketshot patient portal. Now you can access parts of your medical record, email your doctor's office, and request medication refills online. 1. In your internet browser, go to [email or web address] 2. Click on the First Time User? Click Here link in the Sign In box. You will see the New Member Sign Up page. 3. Enter your Marketshot Access Code exactly as it appears below. You will not need to use this code after youve completed the sign-up process. If you do not sign up before the expiration date, you must request a new code. · Marketshot Access Code: [email or web address] Expires: 8/27/2018  8:21 AM 
 
4. Enter the last four digits of your Social Security Number (xxxx) and Date of Birth (mm/dd/yyyy) as indicated and click Submit. You will be taken to the next sign-up page. 5. Create a Marketshot ID. This will be your Marketshot login ID and cannot be changed, so think of one that is secure and easy to remember. 6. Create a DApps Fundt password. You can change your password at any time. 7. Enter your Password Reset Question and Answer. This can be used at a later time if you forget your password. 8. Enter your e-mail address. You will receive e-mail notification when new information is available in [address] 9. Click Sign Up. You can now view and download portions of your medical record. 10. Click the Download Summary menu link to download a portable copy of your medical information. If you have questions, please visit the Frequently Asked Questions section of the [employer] website. Remember, [employer] is NOT to be used for urgent needs. For medical emergencies, dial 911. Now available from your iPhone and Android! Please provide this summary of care documentation to your next provider. Your primary care clinician is listed as ABAD CHAN. If you have any questions after today's visit, please call [phone].

## 2018-07-19 NOTE — PROGRESS NOTES
Chief Complaint   Patient presents with    Sore Throat     right side x 1 week    Ear Pain     left ear x months     Visit Vitals    /85 (BP 1 Location: Left arm, BP Patient Position: Sitting)    Pulse 85    Temp 98.1 °F (36.7 °C) (Oral)    Resp 14    Ht 5' 10\" (1.778 m)    Wt 193 lb 3.2 oz (87.6 kg)    SpO2 97%    BMI 27.72 kg/m2     1. Have you been to the ER, urgent care clinic since your last visit? Hospitalized since your last visit? No    2. Have you seen or consulted any other health care providers outside of the [address] [place] [address] since your last visit? Include any pap smears or colon screening.  No

## 2018-07-19 NOTE — PROGRESS NOTES
HPI/ROS  Patient complains of bilateral ear pressure/pain. Symptoms include congestion, headache described as frontal, lightheadedness, low grade fever, post nasal drip, productive cough with  yellow colored sputum, sinus pressure, tooth pain and vertigo. Onset of symptoms was 5 days ago, gradually worsening since that time. Patient is drinking plenty of fluids. .  Past history is significant for no history of pneumonia or bronchitis. Patient is non-smoker. She is not taking any otc meds for congestion. She took a few days of keflex without improvement. Visit Vitals    /85 (BP 1 Location: Left arm, BP Patient Position: Sitting)    Pulse 85    Temp 98.1 °F (36.7 °C) (Oral)    Resp 14    Ht 5' 10\" (1.778 m)    Wt 193 lb 3.2 oz (87.6 kg)    SpO2 97%    BMI 27.72 kg/m2     Physical Examination:   GENERAL ASSESSMENT: well developed and well nourished  SKIN: normal color, no lesions  HEAD: normocephalic  EYES: normal eyes  EARS: external auditory canal: clear and tympanic membrane: yellow, bulging  NOSE: normal external appearance and nares patent  MOUTH: yellow exudates of OP  NECK: normal  CHEST: normal air exchange, no rales, no rhonchi, no wheezes, respiratory effort normal with no retractions  HEART: regular rate and rhythm, normal S1/S2, no murmurs  ABDOMEN:  not examined  EXTREMITY: not examined  NEURO: not examined    Diagnoses and all orders for this visit:    1. Acute otitis media, unspecified otitis media type    2. Upper respiratory tract infection, unspecified type    Other orders  -     azithromycin (ZITHROMAX Z-DANN) 250 mg tablet; Take two tablets today then one tablet daily      Reveiwed adr/se of medication  Push fluids, rest, suggested mucinex for congestion and drainage  Recheck 5-7 days if sx not improved. I have discussed the diagnosis with the patient and the intended plan as seen in the above orders.  The patient has received an after-visit summary and questions were answered concerning future plans. Patient conveyed understanding of the plan at the time of the visit.     Bren Lovelace, MSN, ANP  7/19/2018

## 2019-12-19 ENCOUNTER — HOSPITAL ENCOUNTER (OUTPATIENT)
Dept: MAMMOGRAPHY | Age: 44
Discharge: HOME | End: 2019-12-19
Attending: NURSE PRACTITIONER

## 2019-12-19 ENCOUNTER — OFFICE VISIT (OUTPATIENT)
Dept: FAMILY PLANNING/WOMEN'S HEALTH CLINIC | Age: 44
End: 2019-12-19

## 2019-12-19 VITALS — SYSTOLIC BLOOD PRESSURE: 129 MMHG | DIASTOLIC BLOOD PRESSURE: 81 MMHG

## 2019-12-19 DIAGNOSIS — Z12.31 VISIT FOR SCREENING MAMMOGRAM: ICD-10-CM

## 2019-12-19 DIAGNOSIS — Z12.31 SCREENING MAMMOGRAM, ENCOUNTER FOR: Primary | ICD-10-CM

## 2019-12-19 PROCEDURE — 77067 SCR MAMMO BI INCL CAD: CPT

## 2019-12-19 NOTE — PROGRESS NOTES
HISTORY OF PRESENT ILLNESS  Sheila De La Torre is a 38 y.o. female here for [facility]. HPI Ms. Esteban Charlton, denies abnormal SBE's, performs on occasion. Her last Pap and mammogram were 5 years ago. LMP 12/14/2019 with heavy monthly cycles, sometimes skips a cycle. FHX of breast cancer in maternal aunt, unknown age. She will be returning to [facility] for her Pap and will go to the [facility] for a wellness check. She has a CAV schedule. Review of Systems   Constitutional: Negative. Physical Exam  Vitals signs and nursing note reviewed. Constitutional:       Appearance: Normal appearance. Chest:      Breasts:         Right: Normal. No swelling, bleeding, inverted nipple, mass, nipple discharge, skin change or tenderness. Left: Normal. No swelling, bleeding, inverted nipple, mass, nipple discharge, skin change or tenderness. Lymphadenopathy:      Upper Body:      Right upper body: No supraclavicular, axillary or pectoral adenopathy. Left upper body: No supraclavicular, axillary or pectoral adenopathy. Skin:     General: Skin is warm and dry. Neurological:      Mental Status: She is alert. Psychiatric:         Mood and Affect: Mood normal.         Behavior: Behavior normal.         ASSESSMENT and PLAN  1. [facility]  2. CBE benign  3. Screening mammogram today      -last mammogram 5 years ago  4. Perimenopause, LMP 12/14/2019  5. Reinforce going to [facility] for a wellness check and labs  6.  Return to [facility] in Jan. 2020 for WWE/Pap

## 2019-12-19 NOTE — PROGRESS NOTES
EVERY WOMANS LIFE HISTORY QUESTIONNAIRE       No Yes Comments   Has a doctor ever seen or felt anything wrong with your breast? [x]                                  []                                     Have you ever had a breast biopsy? [x]                                  []                                          When and where was last mammogram performed? At [facility] yrs [facility] , [facility] not sure    Have you ever been told that there was a problem on your mammogram?   No Yes Comments   [x]                                  []                                       Do you have breast implants? No Yes Comments   [x]                                  []                                       When was your last Pap test performed? At least 5 yrs ago , at an [address] [place] [address] you ever had an abnormal Pap test?   No Yes Comments   [x]                                  []                                       Have you had a hysterectomy? No Yes Comments (why)   [x]                                  []                                  Did have one fallopian tube/ovary removed after ectopic pregnancy     Have you been through menopause? No Yes Date of LMP   [x]                                  []                                  12/14/19 --heavy periods     Did your mother take GINA? No Yes Unknown   [x]                                  []                                       Do you have a history of HIV exposure? No Yes    [x]                                  []                                       Have you ever been diagnosed with any type of Cancer   No Yes Comments (type,when,where,type of treatment   [x]                                  []                                          Has a family member been diagnosed with breast or ovarian cancer?    No Yes Comments (which family members, and type   []                                  [x]                                  Maternal aunt ( 1/1) with breast cancer at unknown age     Are you taking hormone replacement therapy (HRT)     No Yes Comments   [x]                                  []                                       How many times have you been pregnant? 6 or 7       Number of live births ? 1    Are you experiencing any of the following? No Yes Comments   Nipple Discharge [x]                                  []                                     Breast Lump/Masses [x]                                  []                                     Breast Skin Changes [x]                                  []                                          No Yes Comments   Vaginal Discharge [x]                                  []                                     Abnormal/unusual vaginal bleeding [x]                                  []                                  Heavy periods       Are you experiencing any other health problems? HTN---goes to [employer]. Needs PCP too and possibly gyn. Gave CAV info to her        Age at first period?    12/13  Age at first birth? 23    Ht--5' 9\"    Wt--215 #

## 2020-02-24 ENCOUNTER — HOSPITAL ENCOUNTER (OUTPATIENT)
Dept: LAB | Age: 45
Discharge: HOME | End: 2020-02-24

## 2020-02-24 ENCOUNTER — OFFICE VISIT (OUTPATIENT)
Dept: FAMILY PLANNING/WOMEN'S HEALTH CLINIC | Age: 45
End: 2020-02-24

## 2020-02-24 DIAGNOSIS — Z01.419 ENCOUNTER FOR WELL WOMAN EXAM WITH ROUTINE GYNECOLOGICAL EXAM: Primary | ICD-10-CM

## 2020-02-24 PROCEDURE — 88175 CYTOPATH C/V AUTO FLUID REDO: CPT

## 2020-02-24 NOTE — PROGRESS NOTES
Assessment/Plan:    Diagnoses and all orders for this visit:    1. Encounter for well woman exam with routine gynecological exam  -     PAP IG, RFX APTIMA HPV ASCUS ([id number])); Future            ONEIDA Gloria expressed understanding of this plan. An AVS was printed and given to the patient.      ----------------------------------------------------------------------    No chief complaint on file. History of Present Illness:  g8-9 p 1, had ectopic that resulted in left oophorectomy  Has hx of uterine fibroids. Bleeds up to 7 days a month, on her heaviest days needs to change her pad about 6 times  Was recommended for surgery but then lost her insurance. I have provided her with the [facility] and explained how to get in for an appt then referral to gyn can be made  She is not dizzy or having orthostatic sxs at this time  She is a smoker so OCP would not be a viable option  She is in a safe relationship and has no risk of DV   last pap was about 5 years ago, no hx of abnl pap     No past medical history on file. Current Outpatient Medications   Medication Sig Dispense Refill    azithromycin (ZITHROMAX Z-DANN) 250 mg tablet Take two tablets today then one tablet daily 6 Tab 0    multivitamin with iron tablet Take 1 Tab by mouth daily.  simvastatin (ZOCOR) 10 mg tablet Take 1 Tab by mouth nightly. 30 Tab 2    fluticasone (FLONASE) 50 mcg/actuation nasal spray 2 Sprays by Both Nostrils route daily. 1 Bottle 2    amLODIPine (NORVASC) 10 mg tablet Take 1 Tab by mouth daily. 30 Tab 2    fenofibrate nanocrystallized (TRICOR) 145 mg tablet TAKE ONE TABLET BY MOUTH ONCE DAILY 30 Tab 2    ferrous sulfate (IRON) 325 mg (65 mg iron) EC tablet Take 1 Tab by mouth two (2) times a day.  180 Tab 3       Allergies   Allergen Reactions    Ciprofloxacin Swelling    Lisinopril Other (comments)       Social History     Tobacco Use    Smoking status: Current Every Day Smoker Packs/day: 0.50     Years: 15.00     Pack years: 7.50    Smokeless tobacco: Never Used    Tobacco comment: Quit Now Brochure given to pt at 12/19/19 [facility]   Substance Use Topics    Alcohol use: No    Drug use: Yes     Types: Marijuana     Comment: occasionally       Family History   Problem Relation Age of Onset   [facility] Arthritis-rheumatoid Mother     Cancer Mother     Diabetes Mother     Heart Surgery Mother     Hypertension Mother     Cancer Father     Hypertension Father     Gout Father     Arthritis-rheumatoid Father     Breast Cancer Maternal Aunt        Physical Exam:     There were no vitals taken for this visit. A&Ox3  WDWN NAD  Respirations normal and non labored  Pelvic exam- ext neg for lesion or discharge. Cervix and vagina w/out lesion or discharge. Uterus left lyla pelvis enlarged.  Mild fundal tenderness, overall size 10-12 cm

## 2021-04-16 NOTE — PROGRESS NOTES
Chief Complaint   Patient presents with    Yeast Infection     x's 1 week    Nail Problem    Elbow Pain         Pt seen in the office today for possible yeast infection  Pt reports the discharge is \"thick and white\". Denies itching or burning  Pt also requesting a different pill for her toe fungus. She reports an allergic reaction to previous medication for which she is requesting  Also states she is having elbow pain.  Reports the elbow is tender to touch  used